# Patient Record
Sex: MALE | Race: WHITE | NOT HISPANIC OR LATINO | URBAN - NONMETROPOLITAN AREA
[De-identification: names, ages, dates, MRNs, and addresses within clinical notes are randomized per-mention and may not be internally consistent; named-entity substitution may affect disease eponyms.]

---

## 2017-01-17 ENCOUNTER — DOCTOR'S OFFICE (OUTPATIENT)
Dept: URBAN - NONMETROPOLITAN AREA CLINIC 1 | Facility: CLINIC | Age: 67
Setting detail: OPHTHALMOLOGY
End: 2017-01-17
Payer: COMMERCIAL

## 2017-01-17 DIAGNOSIS — H00.15: ICD-10-CM

## 2017-01-17 PROCEDURE — 92012 INTRM OPH EXAM EST PATIENT: CPT | Performed by: OPHTHALMOLOGY

## 2017-01-17 ASSESSMENT — REFRACTION_MANIFEST
OD_SPHERE: +1.00
OD_VA1: 20/25
OS_VA1: 20/25
OS_ADD: +2.50
OS_VA2: 20/
OS_CYLINDER: -1.00
OS_VA3: 20/
OS_VA1: 20/
OS_VA1: 20/
OU_VA: 20/
OD_VA3: 20/
OD_VA1: 20/
OS_AXIS: 085
OD_AXIS: 105
OS_VA3: 20/
OD_VA2: 20/
OS_VA3: 20/
OS_VA2: 20/25
OS_SPHERE: +0.50
OD_VA2: 20/
OD_CYLINDER: -1.00
OD_VA2: 20/25
OD_VA3: 20/
OD_VA3: 20/
OS_VA2: 20/
OU_VA: 20/
OU_VA: 20/
OD_ADD: +2.50
OD_VA1: 20/

## 2017-01-17 ASSESSMENT — REFRACTION_CURRENTRX
OD_OVR_VA: 20/
OS_OVR_VA: 20/
OD_OVR_VA: 20/
OS_OVR_VA: 20/
OD_OVR_VA: 20/
OS_OVR_VA: 20/

## 2017-01-17 ASSESSMENT — REFRACTION_AUTOREFRACTION
OS_AXIS: 64
OD_CYLINDER: -1.25
OD_AXIS: 104
OS_SPHERE: +0.25
OS_CYLINDER: -0.75
OD_SPHERE: +1.50

## 2017-01-17 ASSESSMENT — SPHEQUIV_DERIVED
OD_SPHEQUIV: 0.875
OS_SPHEQUIV: 0
OS_SPHEQUIV: -0.125
OD_SPHEQUIV: 0.5

## 2017-01-17 ASSESSMENT — LID EXAM ASSESSMENTS
OS_BLEPHARITIS: LLL LUL T
OD_BLEPHARITIS: RLL RUL T

## 2017-01-17 ASSESSMENT — VISUAL ACUITY
OS_BCVA: 20/40+1
OD_BCVA: 20/40-2

## 2018-08-09 ENCOUNTER — HOSPITAL ENCOUNTER (EMERGENCY)
Facility: HOSPITAL | Age: 68
Discharge: LEFT BEFORE DISCHARGE | End: 2018-08-10
Attending: EMERGENCY MEDICINE
Payer: MEDICARE

## 2018-08-09 ENCOUNTER — APPOINTMENT (OUTPATIENT)
Dept: RADIOLOGY | Facility: HOSPITAL | Age: 68
End: 2018-08-09
Payer: MEDICARE

## 2018-08-09 DIAGNOSIS — J06.9 URI (UPPER RESPIRATORY INFECTION): ICD-10-CM

## 2018-08-09 DIAGNOSIS — R51.9 CHRONIC HEADACHES: Primary | ICD-10-CM

## 2018-08-09 DIAGNOSIS — G89.29 CHRONIC HEADACHES: Primary | ICD-10-CM

## 2018-08-09 DIAGNOSIS — I10 HYPERTENSION: ICD-10-CM

## 2018-08-09 LAB — LACTATE BLDV-SCNC: 0.9 MMOL/L (ref 0.5–1.99)

## 2018-08-09 PROCEDURE — 6360000200 HC RX 636 W HCPCS (ALT 250 FOR IP): Performed by: EMERGENCY MEDICINE

## 2018-08-09 PROCEDURE — 85610 PROTHROMBIN TIME: CPT | Performed by: EMERGENCY MEDICINE

## 2018-08-09 PROCEDURE — 83605 ASSAY OF LACTIC ACID: CPT

## 2018-08-09 PROCEDURE — 36415 COLL VENOUS BLD VENIPUNCTURE: CPT | Performed by: EMERGENCY MEDICINE

## 2018-08-09 PROCEDURE — 85379 FIBRIN DEGRADATION QUANT: CPT | Performed by: EMERGENCY MEDICINE

## 2018-08-09 PROCEDURE — 93005 ELECTROCARDIOGRAM TRACING: CPT

## 2018-08-09 PROCEDURE — 83880 ASSAY OF NATRIURETIC PEPTIDE: CPT | Performed by: EMERGENCY MEDICINE

## 2018-08-09 PROCEDURE — 84484 ASSAY OF TROPONIN QUANT: CPT | Performed by: EMERGENCY MEDICINE

## 2018-08-09 PROCEDURE — 85730 THROMBOPLASTIN TIME PARTIAL: CPT | Performed by: EMERGENCY MEDICINE

## 2018-08-09 PROCEDURE — 84443 ASSAY THYROID STIM HORMONE: CPT | Performed by: EMERGENCY MEDICINE

## 2018-08-09 PROCEDURE — 82150 ASSAY OF AMYLASE: CPT | Performed by: EMERGENCY MEDICINE

## 2018-08-09 PROCEDURE — 71045 X-RAY EXAM CHEST 1 VIEW: CPT

## 2018-08-09 PROCEDURE — 99284 EMERGENCY DEPT VISIT MOD MDM: CPT | Performed by: EMERGENCY MEDICINE

## 2018-08-09 PROCEDURE — 84439 ASSAY OF FREE THYROXINE: CPT | Performed by: EMERGENCY MEDICINE

## 2018-08-09 PROCEDURE — 85025 COMPLETE CBC W/AUTO DIFF WBC: CPT | Performed by: EMERGENCY MEDICINE

## 2018-08-09 PROCEDURE — 80053 COMPREHEN METABOLIC PANEL: CPT | Performed by: EMERGENCY MEDICINE

## 2018-08-09 PROCEDURE — 2580000300 HC RX 258: Performed by: EMERGENCY MEDICINE

## 2018-08-09 PROCEDURE — 83690 ASSAY OF LIPASE: CPT | Performed by: EMERGENCY MEDICINE

## 2018-08-09 PROCEDURE — 83735 ASSAY OF MAGNESIUM: CPT | Performed by: EMERGENCY MEDICINE

## 2018-08-09 RX ORDER — EZETIMIBE 10 MG/1
10 TABLET ORAL DAILY
COMMUNITY

## 2018-08-09 RX ORDER — SODIUM CHLORIDE 9 MG/ML
1000 INJECTION, SOLUTION INTRAVENOUS ONCE
Status: COMPLETED | OUTPATIENT
Start: 2018-08-09 | End: 2018-08-10

## 2018-08-09 RX ORDER — ONDANSETRON HYDROCHLORIDE 2 MG/ML
4 INJECTION, SOLUTION INTRAVENOUS ONCE
Status: COMPLETED | OUTPATIENT
Start: 2018-08-09 | End: 2018-08-09

## 2018-08-09 RX ORDER — PANTOPRAZOLE SODIUM 40 MG/1
40 TABLET, DELAYED RELEASE ORAL DAILY
COMMUNITY

## 2018-08-09 RX ORDER — NIFEDIPINE 10 MG/1
10 CAPSULE ORAL AS NEEDED
COMMUNITY

## 2018-08-09 RX ORDER — LORAZEPAM 1 MG/1
1 TABLET ORAL 3 TIMES DAILY
COMMUNITY

## 2018-08-09 RX ORDER — CARVEDILOL 6.25 MG/1
6.25 TABLET ORAL 2 TIMES DAILY WITH MEALS
COMMUNITY

## 2018-08-09 RX ORDER — IPRATROPIUM BROMIDE AND ALBUTEROL SULFATE 2.5; .5 MG/3ML; MG/3ML
3 SOLUTION RESPIRATORY (INHALATION) ONCE
Status: COMPLETED | OUTPATIENT
Start: 2018-08-09 | End: 2018-08-09

## 2018-08-09 RX ORDER — SODIUM CHLORIDE 9 MG/ML
100 INJECTION, SOLUTION INTRAVENOUS CONTINUOUS
Status: DISCONTINUED | OUTPATIENT
Start: 2018-08-09 | End: 2018-08-10 | Stop reason: HOSPADM

## 2018-08-09 RX ADMIN — SODIUM CHLORIDE 1000 ML: 9 INJECTION, SOLUTION INTRAVENOUS at 23:59

## 2018-08-09 RX ADMIN — ONDANSETRON HYDROCHLORIDE 4 MG: 2 INJECTION, SOLUTION INTRAMUSCULAR; INTRAVENOUS at 23:59

## 2018-08-09 RX ADMIN — IPRATROPIUM BROMIDE AND ALBUTEROL SULFATE 3 ML: .5; 3 SOLUTION RESPIRATORY (INHALATION) at 23:55

## 2018-08-09 ASSESSMENT — PAIN DESCRIPTION - DESCRIPTORS: DESCRIPTORS: PRESSURE

## 2018-08-10 ENCOUNTER — APPOINTMENT (OUTPATIENT)
Dept: CT IMAGING | Facility: HOSPITAL | Age: 68
End: 2018-08-10
Payer: MEDICARE

## 2018-08-10 VITALS
OXYGEN SATURATION: 93 % | TEMPERATURE: 97.9 F | DIASTOLIC BLOOD PRESSURE: 60 MMHG | HEIGHT: 68 IN | RESPIRATION RATE: 18 BRPM | SYSTOLIC BLOOD PRESSURE: 120 MMHG | BODY MASS INDEX: 36.65 KG/M2 | HEART RATE: 68 BPM | WEIGHT: 241.84 LBS

## 2018-08-10 LAB
ALBUMIN SERPL-MCNC: 4.5 G/DL (ref 3.5–5.3)
ALP SERPL-CCNC: 70 U/L (ref 45–115)
ALT SERPL-CCNC: 30 U/L (ref 0–52)
AMYLASE SERPL-CCNC: 44 U/L (ref 27–103)
ANION GAP SERPL CALC-SCNC: 10 MMOL/L (ref 3–11)
APTT PPP: 28 SECONDS (ref 24–37)
AST SERPL-CCNC: 23 U/L (ref 0–39)
B PARAP IS1001 DNA NPH QL NAA+NON-PROBE: NEGATIVE
B PERT.PT PRMT NPH QL NAA+NON-PROBE: NEGATIVE
BACTERIA #/AREA URNS AUTO: NORMAL /HPF
BASOPHILS # BLD AUTO: 0 10*3/UL
BASOPHILS NFR BLD AUTO: 1 % (ref 0–2)
BILIRUB SERPL-MCNC: 0.74 MG/DL (ref 0–1.4)
BILIRUB UR QL STRIP.AUTO: NEGATIVE
BNP SERPL-MCNC: 20 PG/ML (ref 0–99)
BUN SERPL-MCNC: 24 MG/DL (ref 7–25)
C PNEUM DNA NPH QL NAA+NON-PROBE: NEGATIVE
CALCIUM ALBUM COR SERPL-MCNC: 9 MG/DL (ref 8.5–10.1)
CALCIUM SERPL-MCNC: 9.4 MG/DL (ref 8.6–10.3)
CHLORIDE SERPL-SCNC: 103 MMOL/L (ref 98–107)
CLARITY UR: CLEAR
CO2 SERPL-SCNC: 25 MMOL/L (ref 21–32)
COLOR UR: ABNORMAL
CREAT SERPL-MCNC: 1.6 MG/DL (ref 0.8–1.3)
D DIMER PPP FEU-MCNC: 0.77 UG/ML FEU (ref 0–0.5)
EOSINOPHIL # BLD AUTO: 0 10*3/UL
EOSINOPHIL NFR BLD AUTO: 1 % (ref 0–3)
ERYTHROCYTE [DISTWIDTH] IN BLOOD BY AUTOMATED COUNT: 14.1 % (ref 11.5–15)
FLUAV RNA NPH QL NAA+NON-PROBE: NEGATIVE
FLUBV RNA NPH QL NAA+NON-PROBE: NEGATIVE
GFR SERPL CREATININE-BSD FRML MDRD: 43 ML/MIN/1.73M*2
GLUCOSE SERPL-MCNC: 121 MG/DL (ref 70–105)
GLUCOSE UR STRIP.AUTO-MCNC: NEGATIVE MG/DL
HADV DNA NPH QL NAA+NON-PROBE: NEGATIVE
HCOV 229E RNA NPH QL NAA+NON-PROBE: NEGATIVE
HCOV HKU1 RNA NPH QL NAA+NON-PROBE: NEGATIVE
HCOV NL63 RNA NPH QL NAA+NON-PROBE: NEGATIVE
HCOV OC43 RNA NPH QL NAA+NON-PROBE: NEGATIVE
HCT VFR BLD AUTO: 43.7 % (ref 38–50)
HGB BLD-MCNC: 15.5 G/DL (ref 13.2–17.2)
HGB UR QL STRIP.AUTO: ABNORMAL
HMPV RNA NPH QL NAA+NON-PROBE: NEGATIVE
HPIV1 RNA NPH QL NAA+NON-PROBE: NEGATIVE
HPIV2 RNA NPH QL NAA+NON-PROBE: NEGATIVE
HPIV3 RNA NPH QL NAA+NON-PROBE: NEGATIVE
HPIV4 RNA NPH QL NAA+NON-PROBE: NEGATIVE
INR PPP: 1.13 (ref 0.9–1.1)
KETONES UR STRIP.AUTO-MCNC: NEGATIVE MG/DL
LEUKOCYTE ESTERASE UR QL STRIP: NEGATIVE
LIPASE SERPL-CCNC: 36 U/L (ref 11–82)
LYMPHOCYTES # BLD AUTO: 2.3 10*3/UL
LYMPHOCYTES NFR BLD AUTO: 32 % (ref 15–47)
M PNEUMO DNA NPH QL NAA+NON-PROBE: NEGATIVE
MAGNESIUM SERPL-MCNC: 2 MG/DL (ref 1.8–2.4)
MCH RBC QN AUTO: 32.5 PG (ref 29–34)
MCHC RBC AUTO-ENTMCNC: 35.5 G/DL (ref 32–36)
MCV RBC AUTO: 91.5 FL (ref 82–97)
MONOCYTES # BLD AUTO: 0.7 10*3/UL
MONOCYTES NFR BLD AUTO: 10 % (ref 5–13)
NEUTROPHILS # BLD AUTO: 4.3 10*3/UL
NEUTROPHILS NFR BLD AUTO: 58 % (ref 46–70)
NITRITE UR QL STRIP.AUTO: NEGATIVE
PH UR STRIP.AUTO: 5 PH
PLATELET # BLD AUTO: 195 10*3/UL (ref 130–350)
PMV BLD AUTO: 7.7 FL (ref 6.9–10.8)
POTASSIUM SERPL-SCNC: 3.8 MMOL/L (ref 3.5–5.1)
PROT SERPL-MCNC: 7.4 G/DL (ref 6–8.3)
PROT UR STRIP.AUTO-MCNC: NEGATIVE MG/DL
PROTHROMBIN TIME: 14.1 SECONDS (ref 11.9–14.2)
RBC # BLD AUTO: 4.77 10*6/ΜL (ref 4.1–5.8)
RBC #/AREA URNS AUTO: NORMAL /HPF
RSV RNA NPH QL NAA+NON-PROBE: NEGATIVE
RV+EV RNA NPH QL NAA+NON-PROBE: NEGATIVE
SODIUM SERPL-SCNC: 138 MMOL/L (ref 135–145)
SP GR UR STRIP.AUTO: 1.01 (ref 1–1.03)
SQUAMOUS #/AREA URNS AUTO: NEGATIVE /HPF
T4 FREE SERPL-MCNC: 0.91 NG/DL (ref 0.6–1.2)
TROPONIN I SERPL-MCNC: <0.03 NG/ML
TSH SERPL DL<=0.05 MIU/L-ACNC: 4.95 UIU/ML (ref 0.34–4.82)
UROBILINOGEN UR STRIP.AUTO-MCNC: <2 E.U./DL
WBC # BLD AUTO: 7.4 10*3/UL (ref 3.7–9.6)
WBC #/AREA URNS AUTO: NORMAL /HPF

## 2018-08-10 PROCEDURE — 96374 THER/PROPH/DIAG INJ IV PUSH: CPT

## 2018-08-10 PROCEDURE — 87798 DETECT AGENT NOS DNA AMP: CPT | Performed by: EMERGENCY MEDICINE

## 2018-08-10 PROCEDURE — 94640 AIRWAY INHALATION TREATMENT: CPT

## 2018-08-10 PROCEDURE — 99284 EMERGENCY DEPT VISIT MOD MDM: CPT

## 2018-08-10 PROCEDURE — 2550000100 HC RX 255: Performed by: EMERGENCY MEDICINE

## 2018-08-10 PROCEDURE — 81001 URINALYSIS AUTO W/SCOPE: CPT | Performed by: EMERGENCY MEDICINE

## 2018-08-10 PROCEDURE — 70498 CT ANGIOGRAPHY NECK: CPT

## 2018-08-10 RX ORDER — AMOXICILLIN AND CLAVULANATE POTASSIUM 875; 125 MG/1; MG/1
1 TABLET, FILM COATED ORAL 2 TIMES DAILY
Qty: 14 TABLET | Refills: 0 | Status: SHIPPED | OUTPATIENT
Start: 2018-08-10 | End: 2018-08-17

## 2018-08-10 RX ORDER — PROPARACAINE HYDROCHLORIDE 5 MG/ML
SOLUTION/ DROPS OPHTHALMIC
Status: DISCONTINUED
Start: 2018-08-10 | End: 2018-08-10 | Stop reason: HOSPADM

## 2018-08-10 RX ORDER — IOPAMIDOL 755 MG/ML
100 INJECTION, SOLUTION INTRAVASCULAR ONCE
Status: COMPLETED | OUTPATIENT
Start: 2018-08-10 | End: 2018-08-10

## 2018-08-10 RX ADMIN — IOPAMIDOL 100 ML: 755 INJECTION, SOLUTION INTRAVENOUS at 00:50
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
## 2018-08-20 NOTE — INTERDISCIPLINARY/THERAPY
ED CM: ENDER. Pt at home in [place] and has not f/u with his PCP yet. He states that he will continue to call.

## 2019-08-05 ENCOUNTER — DOCTOR'S OFFICE (OUTPATIENT)
Dept: URBAN - NONMETROPOLITAN AREA CLINIC 1 | Facility: CLINIC | Age: 69
Setting detail: OPHTHALMOLOGY
End: 2019-08-05
Payer: COMMERCIAL

## 2019-08-05 ENCOUNTER — RX ONLY (RX ONLY)
Age: 69
End: 2019-08-05

## 2019-08-05 DIAGNOSIS — H53.123: ICD-10-CM

## 2019-08-05 PROCEDURE — NO CHARGE N/C PROFESSIONAL COURTESY: Performed by: OPHTHALMOLOGY

## 2019-08-05 PROCEDURE — 92012 INTRM OPH EXAM EST PATIENT: CPT | Performed by: OPHTHALMOLOGY

## 2019-08-05 ASSESSMENT — REFRACTION_AUTOREFRACTION
OD_CYLINDER: -1.25
OD_SPHERE: +1.50
OD_AXIS: 104
OS_CYLINDER: -0.75
OS_AXIS: 64
OS_SPHERE: +0.25

## 2019-08-05 ASSESSMENT — LID EXAM ASSESSMENTS
OS_BLEPHARITIS: LLL LUL T
OD_BLEPHARITIS: RLL RUL T

## 2019-08-05 ASSESSMENT — REFRACTION_MANIFEST
OD_VA1: 20/
OD_SPHERE: +1.00
OS_VA1: 20/
OU_VA: 20/
OS_SPHERE: +0.50
OS_VA3: 20/
OD_VA2: 20/
OS_VA2: 20/
OU_VA: 20/
OD_VA3: 20/
OS_ADD: +2.50
OD_AXIS: 105
OD_ADD: +2.50
OD_VA3: 20/
OS_VA1: 20/25
OS_VA3: 20/
OD_VA2: 20/25
OS_CYLINDER: -1.00
OS_AXIS: 085
OD_VA1: 20/25
OS_VA2: 20/25
OD_CYLINDER: -1.00

## 2019-08-05 ASSESSMENT — REFRACTION_CURRENTRX
OS_OVR_VA: 20/
OD_OVR_VA: 20/
OS_OVR_VA: 20/
OS_OVR_VA: 20/
OD_OVR_VA: 20/
OD_OVR_VA: 20/

## 2019-08-05 ASSESSMENT — SPHEQUIV_DERIVED
OD_SPHEQUIV: 0.875
OS_SPHEQUIV: -0.125
OD_SPHEQUIV: 0.5
OS_SPHEQUIV: 0

## 2019-08-05 ASSESSMENT — VISUAL ACUITY
OS_BCVA: 20/40+1
OD_BCVA: 20/40-1

## 2019-08-12 ENCOUNTER — DOCTOR'S OFFICE (OUTPATIENT)
Dept: URBAN - NONMETROPOLITAN AREA CLINIC 1 | Facility: CLINIC | Age: 69
Setting detail: OPHTHALMOLOGY
End: 2019-08-12
Payer: COMMERCIAL

## 2019-08-12 DIAGNOSIS — H53.121: ICD-10-CM

## 2019-08-12 DIAGNOSIS — H34.8322: ICD-10-CM

## 2019-08-12 DIAGNOSIS — H53.122: ICD-10-CM

## 2019-08-12 PROBLEM — H00.15 CHALAZION; LEFT LOWER LID: Status: RESOLVED | Noted: 2017-01-17 | Resolved: 2019-08-12

## 2019-08-12 PROCEDURE — 92235 FLUORESCEIN ANGRPH MLTIFRAME: CPT | Performed by: OPHTHALMOLOGY

## 2019-08-12 PROCEDURE — 92014 COMPRE OPH EXAM EST PT 1/>: CPT | Performed by: OPHTHALMOLOGY

## 2019-08-12 PROCEDURE — 92250 FUNDUS PHOTOGRAPHY W/I&R: CPT | Performed by: OPHTHALMOLOGY

## 2019-08-12 ASSESSMENT — REFRACTION_AUTOREFRACTION
OD_AXIS: 104
OS_CYLINDER: -0.75
OS_SPHERE: +0.25
OS_AXIS: 64
OD_SPHERE: +1.50
OD_CYLINDER: -1.25

## 2019-08-12 ASSESSMENT — SPHEQUIV_DERIVED
OD_SPHEQUIV: 0.875
OS_SPHEQUIV: -0.125
OD_SPHEQUIV: 0.5
OS_SPHEQUIV: 0

## 2019-08-12 ASSESSMENT — REFRACTION_MANIFEST
OS_VA3: 20/
OS_VA2: 20/25
OD_VA2: 20/25
OS_CYLINDER: -1.00
OD_VA2: 20/
OD_VA3: 20/
OD_CYLINDER: -1.00
OS_VA3: 20/
OS_VA1: 20/25
OS_VA1: 20/
OS_AXIS: 085
OS_VA2: 20/
OD_VA3: 20/
OD_VA1: 20/
OD_SPHERE: +1.00
OS_ADD: +2.50
OD_AXIS: 105
OD_VA1: 20/25
OU_VA: 20/
OS_SPHERE: +0.50
OD_ADD: +2.50
OU_VA: 20/

## 2019-08-12 ASSESSMENT — LID EXAM ASSESSMENTS
OS_BLEPHARITIS: LLL LUL T
OD_BLEPHARITIS: RLL RUL T

## 2019-08-12 ASSESSMENT — CONFRONTATIONAL VISUAL FIELD TEST (CVF)
OD_FINDINGS: FULL
OS_FINDINGS: FULL

## 2019-08-12 ASSESSMENT — VISUAL ACUITY
OS_BCVA: 20/25-2
OD_BCVA: 20/40-1

## 2019-08-12 ASSESSMENT — REFRACTION_CURRENTRX
OD_OVR_VA: 20/
OS_OVR_VA: 20/
OD_OVR_VA: 20/
OD_OVR_VA: 20/
OS_OVR_VA: 20/
OS_OVR_VA: 20/

## 2019-08-19 ENCOUNTER — DOCTOR'S OFFICE (OUTPATIENT)
Dept: URBAN - NONMETROPOLITAN AREA CLINIC 1 | Facility: CLINIC | Age: 69
Setting detail: OPHTHALMOLOGY
End: 2019-08-19
Payer: COMMERCIAL

## 2019-08-19 DIAGNOSIS — H34.8322: ICD-10-CM

## 2019-08-19 DIAGNOSIS — H53.123: ICD-10-CM

## 2019-08-19 PROCEDURE — 92014 COMPRE OPH EXAM EST PT 1/>: CPT | Performed by: OPHTHALMOLOGY

## 2019-08-19 ASSESSMENT — REFRACTION_MANIFEST
OU_VA: 20/
OD_VA3: 20/
OS_VA2: 20/25
OS_VA3: 20/
OD_AXIS: 105
OS_VA1: 20/25
OD_VA1: 20/
OD_SPHERE: +1.00
OS_AXIS: 085
OD_ADD: +2.50
OS_CYLINDER: -1.00
OD_VA1: 20/25
OS_VA1: 20/
OS_VA2: 20/
OD_CYLINDER: -1.00
OS_VA3: 20/
OS_SPHERE: +0.50
OD_VA2: 20/25
OU_VA: 20/
OS_ADD: +2.50
OD_VA3: 20/
OD_VA2: 20/

## 2019-08-19 ASSESSMENT — REFRACTION_AUTOREFRACTION
OD_AXIS: 104
OD_CYLINDER: -1.25
OS_SPHERE: +0.25
OS_CYLINDER: -0.75
OD_SPHERE: +1.50
OS_AXIS: 64

## 2019-08-19 ASSESSMENT — LID EXAM ASSESSMENTS
OS_BLEPHARITIS: LLL LUL T
OD_BLEPHARITIS: RLL RUL T

## 2019-08-19 ASSESSMENT — SPHEQUIV_DERIVED
OD_SPHEQUIV: 0.875
OS_SPHEQUIV: 0
OD_SPHEQUIV: 0.5
OS_SPHEQUIV: -0.125

## 2019-08-19 ASSESSMENT — REFRACTION_CURRENTRX
OS_OVR_VA: 20/
OS_OVR_VA: 20/
OD_OVR_VA: 20/
OD_OVR_VA: 20/
OS_OVR_VA: 20/
OD_OVR_VA: 20/

## 2019-08-19 ASSESSMENT — VISUAL ACUITY
OS_BCVA: 20/25
OD_BCVA: 20/30

## 2019-08-19 ASSESSMENT — CONFRONTATIONAL VISUAL FIELD TEST (CVF)
OS_FINDINGS: FULL
OD_FINDINGS: FULL

## 2019-09-19 ENCOUNTER — DOCTOR'S OFFICE (OUTPATIENT)
Dept: URBAN - NONMETROPOLITAN AREA CLINIC 1 | Facility: CLINIC | Age: 69
Setting detail: OPHTHALMOLOGY
End: 2019-09-19
Payer: COMMERCIAL

## 2019-09-19 DIAGNOSIS — H04.121: ICD-10-CM

## 2019-09-19 DIAGNOSIS — H53.123: ICD-10-CM

## 2019-09-19 DIAGNOSIS — H04.122: ICD-10-CM

## 2019-09-19 DIAGNOSIS — H34.8322: ICD-10-CM

## 2019-09-19 PROCEDURE — 92014 COMPRE OPH EXAM EST PT 1/>: CPT | Performed by: OPHTHALMOLOGY

## 2019-09-19 PROCEDURE — 83861 MICROFLUID ANALY TEARS: CPT | Performed by: OPHTHALMOLOGY

## 2019-09-19 PROCEDURE — 92134 CPTRZ OPH DX IMG PST SGM RTA: CPT | Performed by: OPHTHALMOLOGY

## 2019-09-19 ASSESSMENT — REFRACTION_MANIFEST
OS_CYLINDER: -1.00
OD_VA1: 20/
OS_VA1: 20/25
OS_ADD: +2.50
OD_VA2: 20/
OD_CYLINDER: -1.00
OS_VA3: 20/
OS_AXIS: 085
OD_VA3: 20/
OS_VA2: 20/25
OD_VA3: 20/
OD_VA1: 20/25
OS_VA2: 20/
OS_SPHERE: +0.50
OS_VA3: 20/
OD_SPHERE: +1.00
OD_ADD: +2.50
OS_VA1: 20/
OU_VA: 20/
OU_VA: 20/
OD_VA2: 20/25
OD_AXIS: 105

## 2019-09-19 ASSESSMENT — REFRACTION_CURRENTRX
OS_OVR_VA: 20/
OS_OVR_VA: 20/
OD_OVR_VA: 20/
OS_OVR_VA: 20/

## 2019-09-19 ASSESSMENT — REFRACTION_AUTOREFRACTION
OD_SPHERE: +1.50
OD_CYLINDER: -1.25
OD_AXIS: 104
OS_AXIS: 64
OS_SPHERE: +0.25
OS_CYLINDER: -0.75

## 2019-09-19 ASSESSMENT — CONFRONTATIONAL VISUAL FIELD TEST (CVF)
OS_FINDINGS: FULL
OD_FINDINGS: FULL

## 2019-09-19 ASSESSMENT — SPHEQUIV_DERIVED
OS_SPHEQUIV: -0.125
OS_SPHEQUIV: 0
OD_SPHEQUIV: 0.875
OD_SPHEQUIV: 0.5

## 2019-09-19 ASSESSMENT — LID EXAM ASSESSMENTS
OS_BLEPHARITIS: LLL LUL T
OD_BLEPHARITIS: RLL RUL T

## 2019-09-19 ASSESSMENT — VISUAL ACUITY
OD_BCVA: 20/40+1
OS_BCVA: 20/30+2

## 2019-10-24 ENCOUNTER — DOCTOR'S OFFICE (OUTPATIENT)
Dept: URBAN - NONMETROPOLITAN AREA CLINIC 1 | Facility: CLINIC | Age: 69
Setting detail: OPHTHALMOLOGY
End: 2019-10-24
Payer: COMMERCIAL

## 2019-10-24 ENCOUNTER — RX ONLY (RX ONLY)
Age: 69
End: 2019-10-24

## 2019-10-24 DIAGNOSIS — H04.123: ICD-10-CM

## 2019-10-24 DIAGNOSIS — H53.123: ICD-10-CM

## 2019-10-24 DIAGNOSIS — H43.812: ICD-10-CM

## 2019-10-24 DIAGNOSIS — H43.811: ICD-10-CM

## 2019-10-24 DIAGNOSIS — H04.121: ICD-10-CM

## 2019-10-24 DIAGNOSIS — H43.813: ICD-10-CM

## 2019-10-24 DIAGNOSIS — H04.122: ICD-10-CM

## 2019-10-24 DIAGNOSIS — H34.8322: ICD-10-CM

## 2019-10-24 PROCEDURE — 83861 MICROFLUID ANALY TEARS: CPT | Performed by: OPHTHALMOLOGY

## 2019-10-24 PROCEDURE — 92134 CPTRZ OPH DX IMG PST SGM RTA: CPT | Performed by: OPHTHALMOLOGY

## 2019-10-24 PROCEDURE — 92225 OPHTHALMOSCOPY EXTENDED INITIAL: CPT | Performed by: OPHTHALMOLOGY

## 2019-10-24 PROCEDURE — 92014 COMPRE OPH EXAM EST PT 1/>: CPT | Performed by: OPHTHALMOLOGY

## 2019-10-24 ASSESSMENT — REFRACTION_MANIFEST
OS_VA3: 20/
OD_VA1: 20/25
OD_CYLINDER: -1.00
OS_AXIS: 085
OS_CYLINDER: -1.00
OS_VA1: 20/25
OD_SPHERE: +1.00
OS_VA1: 20/
OS_VA2: 20/
OD_VA3: 20/
OS_ADD: +2.50
OD_ADD: +2.50
OS_VA2: 20/25
OD_VA3: 20/
OD_AXIS: 105
OS_SPHERE: +0.50
OD_VA2: 20/25
OS_VA3: 20/
OU_VA: 20/
OU_VA: 20/
OD_VA1: 20/
OD_VA2: 20/

## 2019-10-24 ASSESSMENT — REFRACTION_AUTOREFRACTION
OS_CYLINDER: -0.75
OD_AXIS: 104
OD_CYLINDER: -1.25
OD_SPHERE: +1.50
OS_SPHERE: +0.25
OS_AXIS: 64

## 2019-10-24 ASSESSMENT — REFRACTION_CURRENTRX
OD_OVR_VA: 20/
OS_OVR_VA: 20/
OD_OVR_VA: 20/
OD_OVR_VA: 20/

## 2019-10-24 ASSESSMENT — VISUAL ACUITY
OS_BCVA: 20/30-2
OD_BCVA: 20/50+2

## 2019-10-24 ASSESSMENT — LID EXAM ASSESSMENTS
OD_BLEPHARITIS: RLL RUL T
OS_BLEPHARITIS: LLL LUL T

## 2019-10-24 ASSESSMENT — CONFRONTATIONAL VISUAL FIELD TEST (CVF)
OD_FINDINGS: FULL
OS_FINDINGS: FULL

## 2019-10-24 ASSESSMENT — SPHEQUIV_DERIVED
OD_SPHEQUIV: 0.875
OS_SPHEQUIV: 0
OS_SPHEQUIV: -0.125
OD_SPHEQUIV: 0.5

## 2019-11-25 ENCOUNTER — DOCTOR'S OFFICE (OUTPATIENT)
Dept: URBAN - NONMETROPOLITAN AREA CLINIC 1 | Facility: CLINIC | Age: 69
Setting detail: OPHTHALMOLOGY
End: 2019-11-25
Payer: COMMERCIAL

## 2019-11-25 DIAGNOSIS — H53.123: ICD-10-CM

## 2019-11-25 DIAGNOSIS — H04.122: ICD-10-CM

## 2019-11-25 DIAGNOSIS — H04.123: ICD-10-CM

## 2019-11-25 DIAGNOSIS — H04.121: ICD-10-CM

## 2019-11-25 DIAGNOSIS — H34.8322: ICD-10-CM

## 2019-11-25 PROCEDURE — 92014 COMPRE OPH EXAM EST PT 1/>: CPT | Performed by: OPHTHALMOLOGY

## 2019-11-25 PROCEDURE — 92134 CPTRZ OPH DX IMG PST SGM RTA: CPT | Performed by: OPHTHALMOLOGY

## 2019-11-25 PROCEDURE — 83861 MICROFLUID ANALY TEARS: CPT | Performed by: OPHTHALMOLOGY

## 2019-11-25 ASSESSMENT — REFRACTION_MANIFEST
OD_VA1: 20/25
OS_VA2: 20/
OD_VA3: 20/
OS_SPHERE: +0.50
OS_VA2: 20/25
OS_VA3: 20/
OS_VA3: 20/
OS_AXIS: 085
OD_VA2: 20/
OS_VA1: 20/
OU_VA: 20/
OD_CYLINDER: -1.00
OD_AXIS: 105
OS_CYLINDER: -1.00
OD_SPHERE: +1.00
OD_VA3: 20/
OD_VA2: 20/25
OS_ADD: +2.50
OD_ADD: +2.50
OU_VA: 20/
OD_VA1: 20/
OS_VA1: 20/25

## 2019-11-25 ASSESSMENT — CONFRONTATIONAL VISUAL FIELD TEST (CVF)
OD_FINDINGS: FULL
OS_FINDINGS: FULL

## 2019-11-25 ASSESSMENT — LID EXAM ASSESSMENTS
OD_BLEPHARITIS: RLL RUL T
OS_BLEPHARITIS: LLL LUL T

## 2019-11-25 ASSESSMENT — VISUAL ACUITY
OD_BCVA: 20/40-1
OS_BCVA: 20/30-2

## 2019-11-25 ASSESSMENT — REFRACTION_CURRENTRX
OD_OVR_VA: 20/
OS_OVR_VA: 20/
OD_OVR_VA: 20/
OS_OVR_VA: 20/
OD_OVR_VA: 20/
OS_OVR_VA: 20/

## 2019-11-25 ASSESSMENT — REFRACTION_AUTOREFRACTION
OS_CYLINDER: -0.75
OD_SPHERE: +1.50
OS_AXIS: 64
OD_CYLINDER: -1.25
OD_AXIS: 104
OS_SPHERE: +0.25

## 2019-11-25 ASSESSMENT — SPHEQUIV_DERIVED
OS_SPHEQUIV: 0
OS_SPHEQUIV: -0.125
OD_SPHEQUIV: 0.5
OD_SPHEQUIV: 0.875

## 2020-01-23 ENCOUNTER — DOCTOR'S OFFICE (OUTPATIENT)
Dept: URBAN - NONMETROPOLITAN AREA CLINIC 1 | Facility: CLINIC | Age: 70
Setting detail: OPHTHALMOLOGY
End: 2020-01-23
Payer: COMMERCIAL

## 2020-01-23 DIAGNOSIS — H04.122: ICD-10-CM

## 2020-01-23 DIAGNOSIS — H04.121: ICD-10-CM

## 2020-01-23 DIAGNOSIS — H34.8322: ICD-10-CM

## 2020-01-23 DIAGNOSIS — H04.123: ICD-10-CM

## 2020-01-23 DIAGNOSIS — H25.13: ICD-10-CM

## 2020-01-23 DIAGNOSIS — H53.123: ICD-10-CM

## 2020-01-23 PROCEDURE — 92202 OPSCPY EXTND ON/MAC DRAW: CPT | Performed by: OPHTHALMOLOGY

## 2020-01-23 PROCEDURE — 92134 CPTRZ OPH DX IMG PST SGM RTA: CPT | Performed by: OPHTHALMOLOGY

## 2020-01-23 PROCEDURE — 83861 MICROFLUID ANALY TEARS: CPT | Performed by: OPHTHALMOLOGY

## 2020-01-23 PROCEDURE — 92014 COMPRE OPH EXAM EST PT 1/>: CPT | Performed by: OPHTHALMOLOGY

## 2020-01-23 ASSESSMENT — REFRACTION_MANIFEST
OS_VA2: 20/25
OD_VA2: 20/25
OU_VA: 20/
OS_VA3: 20/
OD_AXIS: 105
OS_AXIS: 085
OD_CYLINDER: -1.00
OD_VA3: 20/
OD_VA1: 20/25
OS_VA1: 20/25
OS_CYLINDER: -1.00
OD_SPHERE: +1.00
OS_ADD: +2.50
OD_ADD: +2.50
OS_SPHERE: +0.50

## 2020-01-23 ASSESSMENT — REFRACTION_AUTOREFRACTION
OD_CYLINDER: -1.25
OD_AXIS: 104
OD_SPHERE: +1.50
OS_AXIS: 64
OS_CYLINDER: -0.75
OS_SPHERE: +0.25

## 2020-01-23 ASSESSMENT — SPHEQUIV_DERIVED
OD_SPHEQUIV: 0.875
OS_SPHEQUIV: 0
OS_SPHEQUIV: -0.125
OD_SPHEQUIV: 0.5

## 2020-01-23 ASSESSMENT — LID EXAM ASSESSMENTS
OD_BLEPHARITIS: RLL RUL T
OS_BLEPHARITIS: LLL LUL T

## 2020-01-23 ASSESSMENT — VISUAL ACUITY
OD_BCVA: 20/40-1
OS_BCVA: 20/30-2

## 2020-01-23 ASSESSMENT — CONFRONTATIONAL VISUAL FIELD TEST (CVF)
OD_FINDINGS: FULL
OS_FINDINGS: FULL

## 2020-01-23 ASSESSMENT — REFRACTION_CURRENTRX
OS_OVR_VA: 20/
OD_OVR_VA: 20/

## 2020-01-30 ENCOUNTER — DOCTOR'S OFFICE (OUTPATIENT)
Dept: URBAN - NONMETROPOLITAN AREA CLINIC 1 | Facility: CLINIC | Age: 70
Setting detail: OPHTHALMOLOGY
End: 2020-01-30
Payer: COMMERCIAL

## 2020-01-30 DIAGNOSIS — H25.13: ICD-10-CM

## 2020-01-30 DIAGNOSIS — H04.123: ICD-10-CM

## 2020-01-30 DIAGNOSIS — H53.123: ICD-10-CM

## 2020-01-30 DIAGNOSIS — H34.8322: ICD-10-CM

## 2020-01-30 PROCEDURE — 92250 FUNDUS PHOTOGRAPHY W/I&R: CPT | Performed by: OPHTHALMOLOGY

## 2020-01-30 PROCEDURE — 92014 COMPRE OPH EXAM EST PT 1/>: CPT | Performed by: OPHTHALMOLOGY

## 2020-01-30 PROCEDURE — 92235 FLUORESCEIN ANGRPH MLTIFRAME: CPT | Performed by: OPHTHALMOLOGY

## 2020-01-30 ASSESSMENT — REFRACTION_AUTOREFRACTION
OD_CYLINDER: -1.25
OD_AXIS: 104
OS_CYLINDER: -0.75
OS_SPHERE: +0.25
OS_AXIS: 64
OD_SPHERE: +1.50

## 2020-01-30 ASSESSMENT — REFRACTION_MANIFEST
OS_SPHERE: +0.50
OD_VA3: 20/
OU_VA: 20/
OD_SPHERE: +1.00
OS_AXIS: 085
OD_VA2: 20/25
OS_VA3: 20/
OS_CYLINDER: -1.00
OD_VA1: 20/25
OS_VA1: 20/25
OD_AXIS: 105
OD_CYLINDER: -1.00
OS_ADD: +2.50
OD_ADD: +2.50
OS_VA2: 20/25

## 2020-01-30 ASSESSMENT — VISUAL ACUITY
OS_BCVA: 20/30-2
OD_BCVA: 20/40

## 2020-01-30 ASSESSMENT — REFRACTION_CURRENTRX
OS_OVR_VA: 20/
OD_OVR_VA: 20/

## 2020-01-30 ASSESSMENT — SPHEQUIV_DERIVED
OD_SPHEQUIV: 0.875
OD_SPHEQUIV: 0.5
OS_SPHEQUIV: -0.125
OS_SPHEQUIV: 0

## 2020-01-30 ASSESSMENT — LID EXAM ASSESSMENTS
OD_BLEPHARITIS: RLL RUL T
OS_BLEPHARITIS: LLL LUL T

## 2020-01-30 ASSESSMENT — CONFRONTATIONAL VISUAL FIELD TEST (CVF)
OS_FINDINGS: FULL
OD_FINDINGS: FULL

## 2020-03-12 ENCOUNTER — DOCTOR'S OFFICE (OUTPATIENT)
Dept: URBAN - NONMETROPOLITAN AREA CLINIC 1 | Facility: CLINIC | Age: 70
Setting detail: OPHTHALMOLOGY
End: 2020-03-12
Payer: COMMERCIAL

## 2020-03-12 DIAGNOSIS — H04.121: ICD-10-CM

## 2020-03-12 DIAGNOSIS — H53.123: ICD-10-CM

## 2020-03-12 DIAGNOSIS — H34.8322: ICD-10-CM

## 2020-03-12 DIAGNOSIS — H25.13: ICD-10-CM

## 2020-03-12 DIAGNOSIS — H04.122: ICD-10-CM

## 2020-03-12 PROCEDURE — 83861 MICROFLUID ANALY TEARS: CPT | Performed by: OPHTHALMOLOGY

## 2020-03-12 PROCEDURE — 92202 OPSCPY EXTND ON/MAC DRAW: CPT | Performed by: OPHTHALMOLOGY

## 2020-03-12 PROCEDURE — 92014 COMPRE OPH EXAM EST PT 1/>: CPT | Performed by: OPHTHALMOLOGY

## 2020-03-12 PROCEDURE — 92134 CPTRZ OPH DX IMG PST SGM RTA: CPT | Performed by: OPHTHALMOLOGY

## 2020-03-12 ASSESSMENT — REFRACTION_MANIFEST
OS_VA1: 20/25
OD_VA1: 20/25
OD_SPHERE: +1.00
OS_CYLINDER: -1.00
OS_ADD: +2.50
OD_CYLINDER: -1.00
OD_VA2: 20/25
OS_VA2: 20/25
OD_ADD: +2.50
OS_SPHERE: +0.50
OD_AXIS: 105
OS_AXIS: 085

## 2020-03-12 ASSESSMENT — SPHEQUIV_DERIVED
OS_SPHEQUIV: 0
OS_SPHEQUIV: -0.125
OD_SPHEQUIV: 0.5
OD_SPHEQUIV: 0.875

## 2020-03-12 ASSESSMENT — REFRACTION_AUTOREFRACTION
OD_AXIS: 104
OS_CYLINDER: -0.75
OD_SPHERE: +1.50
OS_AXIS: 64
OS_SPHERE: +0.25
OD_CYLINDER: -1.25

## 2020-03-12 ASSESSMENT — CONFRONTATIONAL VISUAL FIELD TEST (CVF)
OS_FINDINGS: FULL
OD_FINDINGS: FULL

## 2020-03-12 ASSESSMENT — VISUAL ACUITY
OS_BCVA: 20/50-2
OD_BCVA: 20/30-2

## 2020-03-12 ASSESSMENT — LID EXAM ASSESSMENTS
OD_BLEPHARITIS: RLL RUL T
OS_BLEPHARITIS: LLL LUL T

## 2020-03-12 ASSESSMENT — REFRACTION_CURRENTRX
OD_OVR_VA: 20/
OS_OVR_VA: 20/

## 2020-05-29 ENCOUNTER — DOCTOR'S OFFICE (OUTPATIENT)
Dept: URBAN - NONMETROPOLITAN AREA CLINIC 1 | Facility: CLINIC | Age: 70
Setting detail: OPHTHALMOLOGY
End: 2020-05-29
Payer: COMMERCIAL

## 2020-05-29 ENCOUNTER — RX ONLY (RX ONLY)
Age: 70
End: 2020-05-29

## 2020-05-29 DIAGNOSIS — H25.12: ICD-10-CM

## 2020-05-29 DIAGNOSIS — H25.11: ICD-10-CM

## 2020-05-29 DIAGNOSIS — H53.121: ICD-10-CM

## 2020-05-29 DIAGNOSIS — H34.8322: ICD-10-CM

## 2020-05-29 DIAGNOSIS — H04.123: ICD-10-CM

## 2020-05-29 DIAGNOSIS — H53.122: ICD-10-CM

## 2020-05-29 PROCEDURE — 92134 CPTRZ OPH DX IMG PST SGM RTA: CPT | Performed by: OPHTHALMOLOGY

## 2020-05-29 PROCEDURE — 92014 COMPRE OPH EXAM EST PT 1/>: CPT | Performed by: OPHTHALMOLOGY

## 2020-05-29 PROCEDURE — 83861 MICROFLUID ANALY TEARS: CPT | Performed by: OPHTHALMOLOGY

## 2020-05-29 PROCEDURE — 92202 OPSCPY EXTND ON/MAC DRAW: CPT | Performed by: OPHTHALMOLOGY

## 2020-05-29 ASSESSMENT — CONFRONTATIONAL VISUAL FIELD TEST (CVF)
OS_FINDINGS: FULL
OD_FINDINGS: FULL

## 2020-05-29 ASSESSMENT — REFRACTION_AUTOREFRACTION
OD_AXIS: 104
OD_CYLINDER: -1.25
OS_CYLINDER: -0.75
OS_AXIS: 64
OD_SPHERE: +1.50
OS_SPHERE: +0.25

## 2020-05-29 ASSESSMENT — REFRACTION_MANIFEST
OS_ADD: +2.50
OS_VA2: 20/25
OD_AXIS: 105
OD_VA1: 20/25
OS_VA1: 20/25
OS_AXIS: 085
OD_VA2: 20/25
OD_SPHERE: +1.00
OS_CYLINDER: -1.00
OS_SPHERE: +0.50
OD_ADD: +2.50
OD_CYLINDER: -1.00

## 2020-05-29 ASSESSMENT — SPHEQUIV_DERIVED
OS_SPHEQUIV: 0
OD_SPHEQUIV: 0.875
OS_SPHEQUIV: -0.125
OD_SPHEQUIV: 0.5

## 2020-05-29 ASSESSMENT — VISUAL ACUITY
OD_BCVA: 20/30-2
OS_BCVA: 20/25-1

## 2020-05-29 ASSESSMENT — REFRACTION_CURRENTRX
OS_OVR_VA: 20/
OD_OVR_VA: 20/

## 2020-05-29 ASSESSMENT — LID EXAM ASSESSMENTS
OS_BLEPHARITIS: LLL LUL T
OD_BLEPHARITIS: RLL RUL T

## 2020-06-08 ENCOUNTER — DOCTOR'S OFFICE (OUTPATIENT)
Dept: URBAN - NONMETROPOLITAN AREA CLINIC 1 | Facility: CLINIC | Age: 70
Setting detail: OPHTHALMOLOGY
End: 2020-06-08
Payer: COMMERCIAL

## 2020-06-08 DIAGNOSIS — H35.3132: ICD-10-CM

## 2020-06-08 DIAGNOSIS — H34.8322: ICD-10-CM

## 2020-06-08 PROCEDURE — 92235 FLUORESCEIN ANGRPH MLTIFRAME: CPT | Performed by: OPHTHALMOLOGY

## 2020-06-08 PROCEDURE — 92250 FUNDUS PHOTOGRAPHY W/I&R: CPT | Performed by: OPHTHALMOLOGY

## 2020-06-08 PROCEDURE — 92014 COMPRE OPH EXAM EST PT 1/>: CPT | Performed by: OPHTHALMOLOGY

## 2020-06-08 ASSESSMENT — REFRACTION_AUTOREFRACTION
OS_SPHERE: +0.25
OD_SPHERE: +1.50
OD_CYLINDER: -1.25
OS_AXIS: 64
OD_AXIS: 104
OS_CYLINDER: -0.75

## 2020-06-08 ASSESSMENT — CONFRONTATIONAL VISUAL FIELD TEST (CVF)
OD_FINDINGS: FULL
OS_FINDINGS: FULL

## 2020-06-08 ASSESSMENT — REFRACTION_MANIFEST
OD_SPHERE: +1.00
OD_ADD: +2.50
OS_AXIS: 085
OS_ADD: +2.50
OS_VA1: 20/25
OD_AXIS: 105
OS_SPHERE: +0.50
OS_CYLINDER: -1.00
OD_CYLINDER: -1.00
OD_VA1: 20/25
OD_VA2: 20/25
OS_VA2: 20/25

## 2020-06-08 ASSESSMENT — REFRACTION_CURRENTRX
OD_OVR_VA: 20/
OS_OVR_VA: 20/

## 2020-06-08 ASSESSMENT — SPHEQUIV_DERIVED
OS_SPHEQUIV: 0
OD_SPHEQUIV: 0.5
OD_SPHEQUIV: 0.875
OS_SPHEQUIV: -0.125

## 2020-06-08 ASSESSMENT — VISUAL ACUITY
OD_BCVA: 20/30-2
OS_BCVA: 20/25-1

## 2020-06-08 ASSESSMENT — LID EXAM ASSESSMENTS
OD_BLEPHARITIS: RLL RUL T
OS_BLEPHARITIS: LLL LUL T

## 2020-08-03 ENCOUNTER — DOCTOR'S OFFICE (OUTPATIENT)
Dept: URBAN - NONMETROPOLITAN AREA CLINIC 1 | Facility: CLINIC | Age: 70
Setting detail: OPHTHALMOLOGY
End: 2020-08-03
Payer: COMMERCIAL

## 2020-08-03 DIAGNOSIS — H25.11: ICD-10-CM

## 2020-08-03 DIAGNOSIS — H34.8322: ICD-10-CM

## 2020-08-03 DIAGNOSIS — H53.122: ICD-10-CM

## 2020-08-03 DIAGNOSIS — H53.121: ICD-10-CM

## 2020-08-03 DIAGNOSIS — H35.3132: ICD-10-CM

## 2020-08-03 DIAGNOSIS — H25.12: ICD-10-CM

## 2020-08-03 PROCEDURE — 92134 CPTRZ OPH DX IMG PST SGM RTA: CPT | Performed by: OPHTHALMOLOGY

## 2020-08-03 PROCEDURE — 92014 COMPRE OPH EXAM EST PT 1/>: CPT | Performed by: OPHTHALMOLOGY

## 2020-08-03 PROCEDURE — 92202 OPSCPY EXTND ON/MAC DRAW: CPT | Performed by: OPHTHALMOLOGY

## 2020-08-03 ASSESSMENT — REFRACTION_AUTOREFRACTION
OS_CYLINDER: -0.75
OD_SPHERE: +1.50
OS_AXIS: 64
OS_SPHERE: +0.25
OD_AXIS: 104
OD_CYLINDER: -1.25

## 2020-08-03 ASSESSMENT — LID EXAM ASSESSMENTS
OS_BLEPHARITIS: LLL LUL T
OD_BLEPHARITIS: RLL RUL T

## 2020-08-03 ASSESSMENT — REFRACTION_MANIFEST
OS_SPHERE: +0.50
OS_VA1: 20/25
OD_ADD: +2.50
OS_CYLINDER: -1.00
OD_SPHERE: +1.00
OD_AXIS: 105
OS_AXIS: 085
OS_VA2: 20/25
OD_VA2: 20/25
OD_VA1: 20/25
OD_CYLINDER: -1.00
OS_ADD: +2.50

## 2020-08-03 ASSESSMENT — SPHEQUIV_DERIVED
OD_SPHEQUIV: 0.875
OS_SPHEQUIV: 0
OD_SPHEQUIV: 0.5
OS_SPHEQUIV: -0.125

## 2020-08-03 ASSESSMENT — CONFRONTATIONAL VISUAL FIELD TEST (CVF)
OS_FINDINGS: FULL
OD_FINDINGS: FULL

## 2020-08-03 ASSESSMENT — REFRACTION_CURRENTRX
OD_OVR_VA: 20/
OS_OVR_VA: 20/

## 2020-08-03 ASSESSMENT — VISUAL ACUITY
OD_BCVA: 20/70+2
OS_BCVA: 20/50-2

## 2020-08-07 ENCOUNTER — DOCTOR'S OFFICE (OUTPATIENT)
Dept: URBAN - NONMETROPOLITAN AREA CLINIC 1 | Facility: CLINIC | Age: 70
Setting detail: OPHTHALMOLOGY
End: 2020-08-07
Payer: COMMERCIAL

## 2020-08-07 DIAGNOSIS — H53.122: ICD-10-CM

## 2020-08-07 DIAGNOSIS — H25.11: ICD-10-CM

## 2020-08-07 DIAGNOSIS — H53.121: ICD-10-CM

## 2020-08-07 DIAGNOSIS — H25.12: ICD-10-CM

## 2020-08-07 PROCEDURE — 92083 EXTENDED VISUAL FIELD XM: CPT | Performed by: OPHTHALMOLOGY

## 2020-08-07 PROCEDURE — 92014 COMPRE OPH EXAM EST PT 1/>: CPT | Performed by: OPHTHALMOLOGY

## 2020-08-07 ASSESSMENT — CONFRONTATIONAL VISUAL FIELD TEST (CVF)
OD_FINDINGS: FULL
OS_FINDINGS: FULL

## 2020-08-07 ASSESSMENT — VISUAL ACUITY
OS_BCVA: 20/60-1
OD_BCVA: 20/25

## 2020-08-07 ASSESSMENT — REFRACTION_MANIFEST
OS_CYLINDER: -1.00
OS_VA2: 20/25
OS_SPHERE: +0.50
OS_VA1: 20/25
OD_VA2: 20/25
OS_AXIS: 085
OS_ADD: +2.50
OD_CYLINDER: -1.00
OD_ADD: +2.50
OD_SPHERE: +1.00
OD_AXIS: 105
OD_VA1: 20/25

## 2020-08-07 ASSESSMENT — SPHEQUIV_DERIVED
OD_SPHEQUIV: 0.5
OD_SPHEQUIV: 0.875
OS_SPHEQUIV: 0.5
OS_SPHEQUIV: 0

## 2020-08-07 ASSESSMENT — REFRACTION_AUTOREFRACTION
OS_AXIS: 094
OS_SPHERE: +1.50
OD_CYLINDER: -1.25
OD_AXIS: 098
OS_CYLINDER: -2.00
OD_SPHERE: +1.50

## 2020-08-07 ASSESSMENT — REFRACTION_CURRENTRX
OS_OVR_VA: 20/
OD_OVR_VA: 20/

## 2020-08-07 ASSESSMENT — LID EXAM ASSESSMENTS
OD_BLEPHARITIS: RLL RUL T
OS_BLEPHARITIS: LLL LUL T

## 2020-10-02 ENCOUNTER — DOCTOR'S OFFICE (OUTPATIENT)
Dept: URBAN - NONMETROPOLITAN AREA CLINIC 1 | Facility: CLINIC | Age: 70
Setting detail: OPHTHALMOLOGY
End: 2020-10-02
Payer: COMMERCIAL

## 2020-10-02 DIAGNOSIS — H35.3132: ICD-10-CM

## 2020-10-02 DIAGNOSIS — H34.8322: ICD-10-CM

## 2020-10-02 DIAGNOSIS — H25.12: ICD-10-CM

## 2020-10-02 DIAGNOSIS — H25.11: ICD-10-CM

## 2020-10-02 PROCEDURE — 92202 OPSCPY EXTND ON/MAC DRAW: CPT | Performed by: OPHTHALMOLOGY

## 2020-10-02 PROCEDURE — 92014 COMPRE OPH EXAM EST PT 1/>: CPT | Performed by: OPHTHALMOLOGY

## 2020-10-02 PROCEDURE — 92134 CPTRZ OPH DX IMG PST SGM RTA: CPT | Performed by: OPHTHALMOLOGY

## 2020-10-02 ASSESSMENT — LID EXAM ASSESSMENTS
OD_BLEPHARITIS: RLL RUL T
OS_BLEPHARITIS: LLL LUL T

## 2020-10-02 ASSESSMENT — CONFRONTATIONAL VISUAL FIELD TEST (CVF)
OD_FINDINGS: FULL
OS_FINDINGS: FULL

## 2020-10-09 ASSESSMENT — REFRACTION_CURRENTRX
OS_OVR_VA: 20/
OD_OVR_VA: 20/

## 2020-10-09 ASSESSMENT — SPHEQUIV_DERIVED
OD_SPHEQUIV: 0.875
OS_SPHEQUIV: 0
OS_SPHEQUIV: 0.5
OD_SPHEQUIV: 0.5

## 2020-10-09 ASSESSMENT — REFRACTION_MANIFEST
OD_CYLINDER: -1.00
OD_VA2: 20/25
OD_AXIS: 105
OS_ADD: +2.50
OD_ADD: +2.50
OD_VA1: 20/25
OS_CYLINDER: -1.00
OS_VA2: 20/25
OS_AXIS: 085
OS_VA1: 20/25
OD_SPHERE: +1.00
OS_SPHERE: +0.50

## 2020-10-09 ASSESSMENT — REFRACTION_AUTOREFRACTION
OD_AXIS: 098
OD_CYLINDER: -1.25
OD_SPHERE: +1.50
OS_SPHERE: +1.50
OS_AXIS: 094
OS_CYLINDER: -2.00

## 2020-10-09 ASSESSMENT — VISUAL ACUITY
OD_BCVA: 20/40+2
OS_BCVA: 20/30-2

## 2020-10-19 ENCOUNTER — DOCTOR'S OFFICE (OUTPATIENT)
Dept: URBAN - NONMETROPOLITAN AREA CLINIC 1 | Facility: CLINIC | Age: 70
Setting detail: OPHTHALMOLOGY
End: 2020-10-19
Payer: COMMERCIAL

## 2020-10-19 DIAGNOSIS — H35.3132: ICD-10-CM

## 2020-10-19 DIAGNOSIS — H25.11: ICD-10-CM

## 2020-10-19 DIAGNOSIS — H53.121: ICD-10-CM

## 2020-10-19 DIAGNOSIS — H25.12: ICD-10-CM

## 2020-10-19 DIAGNOSIS — H34.8322: ICD-10-CM

## 2020-10-19 DIAGNOSIS — H53.122: ICD-10-CM

## 2020-10-19 PROCEDURE — 92202 OPSCPY EXTND ON/MAC DRAW: CPT | Performed by: OPHTHALMOLOGY

## 2020-10-19 PROCEDURE — 92014 COMPRE OPH EXAM EST PT 1/>: CPT | Performed by: OPHTHALMOLOGY

## 2020-10-19 PROCEDURE — 92134 CPTRZ OPH DX IMG PST SGM RTA: CPT | Performed by: OPHTHALMOLOGY

## 2020-10-19 ASSESSMENT — REFRACTION_MANIFEST
OD_ADD: +2.50
OS_VA1: 20/25
OS_SPHERE: +0.50
OD_VA2: 20/25
OS_VA2: 20/25
OD_VA1: 20/25
OD_CYLINDER: -1.00
OS_AXIS: 085
OD_AXIS: 105
OS_ADD: +2.50
OD_SPHERE: +1.00
OS_CYLINDER: -1.00

## 2020-10-19 ASSESSMENT — SPHEQUIV_DERIVED
OS_SPHEQUIV: 0
OS_SPHEQUIV: 0.5
OD_SPHEQUIV: 0.5
OD_SPHEQUIV: 0.875

## 2020-10-19 ASSESSMENT — REFRACTION_AUTOREFRACTION
OD_CYLINDER: -1.25
OS_CYLINDER: -2.00
OD_SPHERE: +1.50
OS_SPHERE: +1.50
OD_AXIS: 098
OS_AXIS: 094

## 2020-10-19 ASSESSMENT — CONFRONTATIONAL VISUAL FIELD TEST (CVF)
OD_FINDINGS: FULL
OS_FINDINGS: FULL

## 2020-10-19 ASSESSMENT — REFRACTION_CURRENTRX
OS_OVR_VA: 20/
OD_OVR_VA: 20/

## 2020-10-19 ASSESSMENT — VISUAL ACUITY
OS_BCVA: 20/50+2
OD_BCVA: 20/40-1

## 2020-10-29 ENCOUNTER — DOCTOR'S OFFICE (OUTPATIENT)
Dept: URBAN - NONMETROPOLITAN AREA CLINIC 1 | Facility: CLINIC | Age: 70
Setting detail: OPHTHALMOLOGY
End: 2020-10-29
Payer: COMMERCIAL

## 2020-10-29 DIAGNOSIS — H34.8322: ICD-10-CM

## 2020-10-29 DIAGNOSIS — H53.122: ICD-10-CM

## 2020-10-29 DIAGNOSIS — H35.3132: ICD-10-CM

## 2020-10-29 DIAGNOSIS — H25.12: ICD-10-CM

## 2020-10-29 DIAGNOSIS — H25.11: ICD-10-CM

## 2020-10-29 DIAGNOSIS — H53.121: ICD-10-CM

## 2020-10-29 PROCEDURE — 92235 FLUORESCEIN ANGRPH MLTIFRAME: CPT | Performed by: OPHTHALMOLOGY

## 2020-10-29 PROCEDURE — 92250 FUNDUS PHOTOGRAPHY W/I&R: CPT | Performed by: OPHTHALMOLOGY

## 2020-10-29 PROCEDURE — 92014 COMPRE OPH EXAM EST PT 1/>: CPT | Performed by: OPHTHALMOLOGY

## 2020-10-29 ASSESSMENT — REFRACTION_AUTOREFRACTION
OS_AXIS: 094
OS_SPHERE: +1.50
OD_CYLINDER: -1.25
OD_SPHERE: +1.50
OS_CYLINDER: -2.00
OD_AXIS: 098

## 2020-10-29 ASSESSMENT — CONFRONTATIONAL VISUAL FIELD TEST (CVF)
OD_FINDINGS: FULL
OS_FINDINGS: FULL

## 2020-10-29 ASSESSMENT — REFRACTION_MANIFEST
OS_CYLINDER: -1.00
OD_AXIS: 105
OS_ADD: +2.50
OD_SPHERE: +1.00
OD_VA1: 20/25
OS_VA2: 20/25
OD_VA2: 20/25
OS_VA1: 20/25
OD_CYLINDER: -1.00
OD_ADD: +2.50
OS_AXIS: 085
OS_SPHERE: +0.50

## 2020-10-29 ASSESSMENT — SPHEQUIV_DERIVED
OD_SPHEQUIV: 0.875
OD_SPHEQUIV: 0.5
OS_SPHEQUIV: 0.5
OS_SPHEQUIV: 0

## 2020-10-29 ASSESSMENT — REFRACTION_CURRENTRX
OS_OVR_VA: 20/
OD_OVR_VA: 20/

## 2020-10-29 ASSESSMENT — VISUAL ACUITY
OS_BCVA: 20/50+2
OD_BCVA: 20/40-1

## 2020-11-16 ENCOUNTER — DOCTOR'S OFFICE (OUTPATIENT)
Dept: URBAN - NONMETROPOLITAN AREA CLINIC 1 | Facility: CLINIC | Age: 70
Setting detail: OPHTHALMOLOGY
End: 2020-11-16
Payer: COMMERCIAL

## 2020-11-16 DIAGNOSIS — H34.8322: ICD-10-CM

## 2020-11-16 DIAGNOSIS — H40.051: ICD-10-CM

## 2020-11-16 DIAGNOSIS — H25.11: ICD-10-CM

## 2020-11-16 DIAGNOSIS — H53.122: ICD-10-CM

## 2020-11-16 DIAGNOSIS — H35.3132: ICD-10-CM

## 2020-11-16 DIAGNOSIS — H53.121: ICD-10-CM

## 2020-11-16 DIAGNOSIS — H25.12: ICD-10-CM

## 2020-11-16 PROCEDURE — 92014 COMPRE OPH EXAM EST PT 1/>: CPT | Performed by: OPHTHALMOLOGY

## 2020-11-16 PROCEDURE — 92134 CPTRZ OPH DX IMG PST SGM RTA: CPT | Performed by: OPHTHALMOLOGY

## 2020-11-16 PROCEDURE — 92202 OPSCPY EXTND ON/MAC DRAW: CPT | Performed by: OPHTHALMOLOGY

## 2020-11-16 ASSESSMENT — REFRACTION_MANIFEST
OS_AXIS: 085
OS_SPHERE: +0.50
OD_AXIS: 105
OS_CYLINDER: -1.00
OD_CYLINDER: -1.00
OS_VA2: 20/25
OD_VA1: 20/25
OS_ADD: +2.50
OD_ADD: +2.50
OS_VA1: 20/25
OD_SPHERE: +1.00
OD_VA2: 20/25

## 2020-11-16 ASSESSMENT — SPHEQUIV_DERIVED
OS_SPHEQUIV: 0.5
OD_SPHEQUIV: 0.875
OD_SPHEQUIV: 0.5
OS_SPHEQUIV: 0

## 2020-11-16 ASSESSMENT — VISUAL ACUITY
OD_BCVA: 20/40-2
OS_BCVA: 20/40-1

## 2020-11-16 ASSESSMENT — REFRACTION_AUTOREFRACTION
OS_SPHERE: +1.50
OS_CYLINDER: -2.00
OD_SPHERE: +1.50
OD_CYLINDER: -1.25
OS_AXIS: 094
OD_AXIS: 098

## 2020-11-16 ASSESSMENT — REFRACTION_CURRENTRX
OD_OVR_VA: 20/
OS_OVR_VA: 20/

## 2020-11-16 ASSESSMENT — CONFRONTATIONAL VISUAL FIELD TEST (CVF)
OD_FINDINGS: FULL
OS_FINDINGS: FULL

## 2020-12-21 ENCOUNTER — DOCTOR'S OFFICE (OUTPATIENT)
Dept: URBAN - NONMETROPOLITAN AREA CLINIC 1 | Facility: CLINIC | Age: 70
Setting detail: OPHTHALMOLOGY
End: 2020-12-21
Payer: COMMERCIAL

## 2020-12-21 DIAGNOSIS — H35.3132: ICD-10-CM

## 2020-12-21 DIAGNOSIS — H34.8322: ICD-10-CM

## 2020-12-21 DIAGNOSIS — H53.122: ICD-10-CM

## 2020-12-21 DIAGNOSIS — H25.11: ICD-10-CM

## 2020-12-21 DIAGNOSIS — H40.051: ICD-10-CM

## 2020-12-21 DIAGNOSIS — H25.12: ICD-10-CM

## 2020-12-21 DIAGNOSIS — H53.121: ICD-10-CM

## 2020-12-21 PROCEDURE — 92133 CPTRZD OPH DX IMG PST SGM ON: CPT | Performed by: OPHTHALMOLOGY

## 2020-12-21 PROCEDURE — 92014 COMPRE OPH EXAM EST PT 1/>: CPT | Performed by: OPHTHALMOLOGY

## 2020-12-21 PROCEDURE — 76514 ECHO EXAM OF EYE THICKNESS: CPT | Performed by: OPHTHALMOLOGY

## 2020-12-21 ASSESSMENT — CONFRONTATIONAL VISUAL FIELD TEST (CVF)
OD_FINDINGS: FULL
OS_FINDINGS: FULL

## 2020-12-21 ASSESSMENT — REFRACTION_MANIFEST
OD_AXIS: 105
OD_SPHERE: +1.00
OS_ADD: +2.50
OS_VA1: 20/25
OD_VA1: 20/25
OS_SPHERE: +0.50
OS_VA2: 20/25
OD_CYLINDER: -1.00
OD_VA2: 20/25
OS_AXIS: 085
OS_CYLINDER: -1.00
OD_ADD: +2.50

## 2020-12-21 ASSESSMENT — PACHYMETRY
OD_CT_CORRECTION: -1
OD_CT_UM: 557
OS_CT_CORRECTION: -1
OS_CT_UM: 553

## 2020-12-21 ASSESSMENT — SPHEQUIV_DERIVED
OS_SPHEQUIV: 0
OD_SPHEQUIV: 0.875
OS_SPHEQUIV: 0.5
OD_SPHEQUIV: 0.5

## 2020-12-21 ASSESSMENT — REFRACTION_AUTOREFRACTION
OS_SPHERE: +1.50
OS_AXIS: 094
OD_SPHERE: +1.50
OD_AXIS: 098
OD_CYLINDER: -1.25
OS_CYLINDER: -2.00

## 2020-12-21 ASSESSMENT — REFRACTION_CURRENTRX
OD_OVR_VA: 20/
OS_OVR_VA: 20/

## 2020-12-21 ASSESSMENT — VISUAL ACUITY
OS_BCVA: 20/50-1
OD_BCVA: 20/40-1

## 2021-01-18 ENCOUNTER — DOCTOR'S OFFICE (OUTPATIENT)
Dept: URBAN - NONMETROPOLITAN AREA CLINIC 1 | Facility: CLINIC | Age: 71
Setting detail: OPHTHALMOLOGY
End: 2021-01-18
Payer: COMMERCIAL

## 2021-01-18 DIAGNOSIS — H25.12: ICD-10-CM

## 2021-01-18 DIAGNOSIS — H35.3132: ICD-10-CM

## 2021-01-18 DIAGNOSIS — H25.11: ICD-10-CM

## 2021-01-18 DIAGNOSIS — H04.123: ICD-10-CM

## 2021-01-18 DIAGNOSIS — H34.8322: ICD-10-CM

## 2021-01-18 DIAGNOSIS — H53.122: ICD-10-CM

## 2021-01-18 DIAGNOSIS — H40.051: ICD-10-CM

## 2021-01-18 DIAGNOSIS — H53.121: ICD-10-CM

## 2021-01-18 PROCEDURE — 83861 MICROFLUID ANALY TEARS: CPT | Performed by: OPHTHALMOLOGY

## 2021-01-18 PROCEDURE — 92134 CPTRZ OPH DX IMG PST SGM RTA: CPT | Performed by: OPHTHALMOLOGY

## 2021-01-18 PROCEDURE — 92014 COMPRE OPH EXAM EST PT 1/>: CPT | Performed by: OPHTHALMOLOGY

## 2021-01-18 PROCEDURE — 92202 OPSCPY EXTND ON/MAC DRAW: CPT | Performed by: OPHTHALMOLOGY

## 2021-01-18 ASSESSMENT — REFRACTION_MANIFEST
OS_VA1: 20/25
OD_CYLINDER: -1.00
OS_SPHERE: +0.50
OD_VA2: 20/25
OS_AXIS: 085
OS_ADD: +2.50
OD_SPHERE: +1.00
OD_AXIS: 105
OS_CYLINDER: -1.00
OD_VA1: 20/25
OS_VA2: 20/25
OD_ADD: +2.50

## 2021-01-18 ASSESSMENT — PACHYMETRY
OS_CT_CORRECTION: -1
OS_CT_UM: 553
OD_CT_CORRECTION: -1
OD_CT_UM: 557

## 2021-01-18 ASSESSMENT — REFRACTION_CURRENTRX
OS_OVR_VA: 20/
OD_OVR_VA: 20/

## 2021-01-18 ASSESSMENT — VISUAL ACUITY
OD_BCVA: 20/50
OS_BCVA: 20/30

## 2021-01-18 ASSESSMENT — REFRACTION_AUTOREFRACTION
OD_SPHERE: +1.50
OD_CYLINDER: -1.25
OS_AXIS: 094
OS_CYLINDER: -2.00
OS_SPHERE: +1.50
OD_AXIS: 098

## 2021-01-18 ASSESSMENT — SPHEQUIV_DERIVED
OD_SPHEQUIV: 0.5
OS_SPHEQUIV: 0.5
OD_SPHEQUIV: 0.875
OS_SPHEQUIV: 0

## 2021-01-18 ASSESSMENT — CONFRONTATIONAL VISUAL FIELD TEST (CVF)
OD_FINDINGS: FULL
OS_FINDINGS: FULL

## 2021-02-15 ENCOUNTER — DOCTOR'S OFFICE (OUTPATIENT)
Dept: URBAN - NONMETROPOLITAN AREA CLINIC 1 | Facility: CLINIC | Age: 71
Setting detail: OPHTHALMOLOGY
End: 2021-02-15
Payer: COMMERCIAL

## 2021-02-15 DIAGNOSIS — H40.051: ICD-10-CM

## 2021-02-15 PROCEDURE — 92012 INTRM OPH EXAM EST PATIENT: CPT | Performed by: OPHTHALMOLOGY

## 2021-02-15 ASSESSMENT — REFRACTION_MANIFEST
OD_VA1: 20/25
OD_CYLINDER: -1.00
OS_SPHERE: +0.50
OD_AXIS: 105
OS_VA1: 20/25
OS_CYLINDER: -1.00
OS_AXIS: 085
OS_VA2: 20/25
OD_SPHERE: +1.00
OD_VA2: 20/25
OD_ADD: +2.50
OS_ADD: +2.50

## 2021-02-15 ASSESSMENT — CONFRONTATIONAL VISUAL FIELD TEST (CVF)
OS_FINDINGS: FULL
OD_FINDINGS: FULL

## 2021-02-15 ASSESSMENT — REFRACTION_AUTOREFRACTION
OD_CYLINDER: -1.25
OS_AXIS: 094
OD_SPHERE: +1.50
OD_AXIS: 098
OS_CYLINDER: -2.00
OS_SPHERE: +1.50

## 2021-02-15 ASSESSMENT — SPHEQUIV_DERIVED
OD_SPHEQUIV: 0.875
OS_SPHEQUIV: 0.5
OD_SPHEQUIV: 0.5
OS_SPHEQUIV: 0

## 2021-02-15 ASSESSMENT — REFRACTION_CURRENTRX
OD_OVR_VA: 20/
OS_OVR_VA: 20/

## 2021-02-15 ASSESSMENT — VISUAL ACUITY
OD_BCVA: 20/40-1
OS_BCVA: 20/40-1

## 2021-03-11 ENCOUNTER — DOCTOR'S OFFICE (OUTPATIENT)
Dept: URBAN - NONMETROPOLITAN AREA CLINIC 1 | Facility: CLINIC | Age: 71
Setting detail: OPHTHALMOLOGY
End: 2021-03-11
Payer: COMMERCIAL

## 2021-03-11 DIAGNOSIS — H04.123: ICD-10-CM

## 2021-03-11 DIAGNOSIS — H35.3132: ICD-10-CM

## 2021-03-11 DIAGNOSIS — H40.051: ICD-10-CM

## 2021-03-11 DIAGNOSIS — H25.12: ICD-10-CM

## 2021-03-11 DIAGNOSIS — H34.8322: ICD-10-CM

## 2021-03-11 DIAGNOSIS — H25.11: ICD-10-CM

## 2021-03-11 PROCEDURE — 92202 OPSCPY EXTND ON/MAC DRAW: CPT | Performed by: OPHTHALMOLOGY

## 2021-03-11 PROCEDURE — 92134 CPTRZ OPH DX IMG PST SGM RTA: CPT | Performed by: OPHTHALMOLOGY

## 2021-03-11 PROCEDURE — 92014 COMPRE OPH EXAM EST PT 1/>: CPT | Performed by: OPHTHALMOLOGY

## 2021-03-11 ASSESSMENT — REFRACTION_MANIFEST
OS_CYLINDER: -1.00
OD_AXIS: 105
OS_VA1: 20/25
OD_VA1: 20/25
OS_AXIS: 085
OD_ADD: +2.50
OS_SPHERE: +0.50
OS_VA2: 20/25
OD_SPHERE: +1.00
OD_VA2: 20/25
OS_ADD: +2.50
OD_CYLINDER: -1.00

## 2021-03-11 ASSESSMENT — SPHEQUIV_DERIVED
OS_SPHEQUIV: 0
OD_SPHEQUIV: 0.5
OD_SPHEQUIV: 0.875
OS_SPHEQUIV: 0.5

## 2021-03-11 ASSESSMENT — REFRACTION_AUTOREFRACTION
OD_CYLINDER: -1.25
OD_SPHERE: +1.50
OS_CYLINDER: -2.00
OS_AXIS: 094
OD_AXIS: 098
OS_SPHERE: +1.50

## 2021-03-11 ASSESSMENT — REFRACTION_CURRENTRX
OS_OVR_VA: 20/
OD_OVR_VA: 20/

## 2021-03-11 ASSESSMENT — VISUAL ACUITY
OS_BCVA: 20/30-2
OD_BCVA: 20/30-2

## 2021-03-11 ASSESSMENT — CONFRONTATIONAL VISUAL FIELD TEST (CVF)
OS_FINDINGS: FULL
OD_FINDINGS: FULL

## 2021-06-03 ENCOUNTER — DOCTOR'S OFFICE (OUTPATIENT)
Dept: URBAN - NONMETROPOLITAN AREA CLINIC 1 | Facility: CLINIC | Age: 71
Setting detail: OPHTHALMOLOGY
End: 2021-06-03
Payer: COMMERCIAL

## 2021-06-03 DIAGNOSIS — H40.051: ICD-10-CM

## 2021-06-03 DIAGNOSIS — H35.3132: ICD-10-CM

## 2021-06-03 DIAGNOSIS — H25.12: ICD-10-CM

## 2021-06-03 DIAGNOSIS — H04.123: ICD-10-CM

## 2021-06-03 DIAGNOSIS — H25.11: ICD-10-CM

## 2021-06-03 DIAGNOSIS — H34.8322: ICD-10-CM

## 2021-06-03 PROCEDURE — 92014 COMPRE OPH EXAM EST PT 1/>: CPT | Performed by: OPHTHALMOLOGY

## 2021-06-03 PROCEDURE — 92202 OPSCPY EXTND ON/MAC DRAW: CPT | Performed by: OPHTHALMOLOGY

## 2021-06-03 PROCEDURE — 92134 CPTRZ OPH DX IMG PST SGM RTA: CPT | Performed by: OPHTHALMOLOGY

## 2021-06-03 ASSESSMENT — REFRACTION_MANIFEST
OS_VA1: 20/25
OS_AXIS: 085
OS_VA2: 20/25
OS_SPHERE: +0.50
OD_SPHERE: +1.00
OS_ADD: +2.50
OD_VA2: 20/25
OD_VA1: 20/25
OD_ADD: +2.50
OD_AXIS: 105
OS_CYLINDER: -1.00
OD_CYLINDER: -1.00

## 2021-06-03 ASSESSMENT — REFRACTION_CURRENTRX
OS_OVR_VA: 20/
OD_OVR_VA: 20/

## 2021-06-03 ASSESSMENT — SPHEQUIV_DERIVED
OD_SPHEQUIV: 0.875
OS_SPHEQUIV: 0.5
OD_SPHEQUIV: 0.5
OS_SPHEQUIV: 0

## 2021-06-03 ASSESSMENT — VISUAL ACUITY
OD_BCVA: 20/50
OS_BCVA: 20/40-1

## 2021-06-03 ASSESSMENT — REFRACTION_AUTOREFRACTION
OS_SPHERE: +1.50
OD_AXIS: 098
OD_SPHERE: +1.50
OS_CYLINDER: -2.00
OS_AXIS: 094
OD_CYLINDER: -1.25

## 2021-06-03 ASSESSMENT — CONFRONTATIONAL VISUAL FIELD TEST (CVF)
OD_FINDINGS: FULL
OS_FINDINGS: FULL

## 2021-07-15 ENCOUNTER — DOCTOR'S OFFICE (OUTPATIENT)
Dept: URBAN - NONMETROPOLITAN AREA CLINIC 1 | Facility: CLINIC | Age: 71
Setting detail: OPHTHALMOLOGY
End: 2021-07-15
Payer: COMMERCIAL

## 2021-07-15 ENCOUNTER — RX ONLY (RX ONLY)
Age: 71
End: 2021-07-15

## 2021-07-15 DIAGNOSIS — H34.8322: ICD-10-CM

## 2021-07-15 DIAGNOSIS — H40.051: ICD-10-CM

## 2021-07-15 DIAGNOSIS — H25.12: ICD-10-CM

## 2021-07-15 DIAGNOSIS — H35.3132: ICD-10-CM

## 2021-07-15 DIAGNOSIS — H25.11: ICD-10-CM

## 2021-07-15 PROCEDURE — 92134 CPTRZ OPH DX IMG PST SGM RTA: CPT | Performed by: OPHTHALMOLOGY

## 2021-07-15 PROCEDURE — 92202 OPSCPY EXTND ON/MAC DRAW: CPT | Performed by: OPHTHALMOLOGY

## 2021-07-15 PROCEDURE — 92014 COMPRE OPH EXAM EST PT 1/>: CPT | Performed by: OPHTHALMOLOGY

## 2021-07-15 ASSESSMENT — REFRACTION_CURRENTRX
OS_OVR_VA: 20/
OD_OVR_VA: 20/

## 2021-07-15 ASSESSMENT — REFRACTION_AUTOREFRACTION
OD_CYLINDER: -1.25
OS_CYLINDER: -2.00
OD_SPHERE: +1.50
OS_AXIS: 094
OS_SPHERE: +1.50
OD_AXIS: 098

## 2021-07-15 ASSESSMENT — REFRACTION_MANIFEST
OD_AXIS: 105
OD_SPHERE: +1.00
OS_CYLINDER: -1.00
OS_ADD: +2.50
OD_VA1: 20/25
OS_SPHERE: +0.50
OD_CYLINDER: -1.00
OS_VA2: 20/25
OS_AXIS: 085
OD_ADD: +2.50
OD_VA2: 20/25
OS_VA1: 20/25

## 2021-07-15 ASSESSMENT — VISUAL ACUITY
OD_BCVA: 20/60
OS_BCVA: 20/60

## 2021-07-15 ASSESSMENT — SPHEQUIV_DERIVED
OD_SPHEQUIV: 0.5
OS_SPHEQUIV: 0.5
OD_SPHEQUIV: 0.875
OS_SPHEQUIV: 0

## 2021-07-15 ASSESSMENT — CONFRONTATIONAL VISUAL FIELD TEST (CVF)
OD_FINDINGS: FULL
OS_FINDINGS: FULL

## 2021-07-22 ENCOUNTER — DOCTOR'S OFFICE (OUTPATIENT)
Dept: URBAN - NONMETROPOLITAN AREA CLINIC 1 | Facility: CLINIC | Age: 71
Setting detail: OPHTHALMOLOGY
End: 2021-07-22
Payer: COMMERCIAL

## 2021-07-22 DIAGNOSIS — H34.8322: ICD-10-CM

## 2021-07-22 DIAGNOSIS — H35.3132: ICD-10-CM

## 2021-07-22 PROCEDURE — 92014 COMPRE OPH EXAM EST PT 1/>: CPT | Performed by: OPHTHALMOLOGY

## 2021-07-22 PROCEDURE — 92250 FUNDUS PHOTOGRAPHY W/I&R: CPT | Performed by: OPHTHALMOLOGY

## 2021-07-22 PROCEDURE — 92235 FLUORESCEIN ANGRPH MLTIFRAME: CPT | Performed by: OPHTHALMOLOGY

## 2021-07-22 ASSESSMENT — CONFRONTATIONAL VISUAL FIELD TEST (CVF)
OD_FINDINGS: FULL
OS_FINDINGS: FULL

## 2021-07-22 ASSESSMENT — REFRACTION_MANIFEST
OD_CYLINDER: -1.00
OD_VA1: 20/25
OD_SPHERE: +1.00
OD_VA2: 20/25
OD_ADD: +2.50
OS_CYLINDER: -1.00
OS_ADD: +2.50
OS_VA2: 20/25
OD_AXIS: 105
OS_SPHERE: +0.50
OS_VA1: 20/25
OS_AXIS: 085

## 2021-07-22 ASSESSMENT — SPHEQUIV_DERIVED
OS_SPHEQUIV: 0.5
OS_SPHEQUIV: 0
OD_SPHEQUIV: 0.875
OD_SPHEQUIV: 0.5

## 2021-07-22 ASSESSMENT — REFRACTION_AUTOREFRACTION
OD_AXIS: 098
OS_AXIS: 094
OD_SPHERE: +1.50
OS_SPHERE: +1.50
OS_CYLINDER: -2.00
OD_CYLINDER: -1.25

## 2021-07-22 ASSESSMENT — VISUAL ACUITY
OD_BCVA: 20/60-2
OS_BCVA: 20/40-2

## 2021-07-22 ASSESSMENT — REFRACTION_CURRENTRX
OD_OVR_VA: 20/
OS_OVR_VA: 20/

## 2021-10-18 ENCOUNTER — DOCTOR'S OFFICE (OUTPATIENT)
Dept: URBAN - NONMETROPOLITAN AREA CLINIC 1 | Facility: CLINIC | Age: 71
Setting detail: OPHTHALMOLOGY
End: 2021-10-18
Payer: COMMERCIAL

## 2021-10-18 DIAGNOSIS — H25.12: ICD-10-CM

## 2021-10-18 DIAGNOSIS — H35.3132: ICD-10-CM

## 2021-10-18 DIAGNOSIS — H34.8322: ICD-10-CM

## 2021-10-18 DIAGNOSIS — H04.121: ICD-10-CM

## 2021-10-18 DIAGNOSIS — H40.051: ICD-10-CM

## 2021-10-18 DIAGNOSIS — H25.11: ICD-10-CM

## 2021-10-18 DIAGNOSIS — H04.122: ICD-10-CM

## 2021-10-18 PROCEDURE — 92202 OPSCPY EXTND ON/MAC DRAW: CPT | Performed by: OPHTHALMOLOGY

## 2021-10-18 PROCEDURE — 83861 MICROFLUID ANALY TEARS: CPT | Performed by: OPHTHALMOLOGY

## 2021-10-18 PROCEDURE — 99214 OFFICE O/P EST MOD 30 MIN: CPT | Performed by: OPHTHALMOLOGY

## 2021-10-18 PROCEDURE — 92134 CPTRZ OPH DX IMG PST SGM RTA: CPT | Performed by: OPHTHALMOLOGY

## 2021-10-18 ASSESSMENT — SPHEQUIV_DERIVED
OD_SPHEQUIV: 0.5
OD_SPHEQUIV: 0.875
OS_SPHEQUIV: 0
OS_SPHEQUIV: 0.5

## 2021-10-18 ASSESSMENT — REFRACTION_AUTOREFRACTION
OD_CYLINDER: -1.25
OS_CYLINDER: -2.00
OS_SPHERE: +1.50
OD_AXIS: 098
OD_SPHERE: +1.50
OS_AXIS: 094

## 2021-10-18 ASSESSMENT — VISUAL ACUITY
OD_BCVA: 20/60+2
OS_BCVA: 20/40+1

## 2021-10-18 ASSESSMENT — REFRACTION_MANIFEST
OD_VA1: 20/25
OS_AXIS: 085
OD_VA2: 20/25
OD_SPHERE: +1.00
OD_CYLINDER: -1.00
OS_SPHERE: +0.50
OD_AXIS: 105
OS_VA2: 20/25
OD_ADD: +2.50
OS_CYLINDER: -1.00
OS_VA1: 20/25
OS_ADD: +2.50

## 2021-10-18 ASSESSMENT — REFRACTION_CURRENTRX
OD_OVR_VA: 20/
OS_OVR_VA: 20/

## 2021-10-18 ASSESSMENT — CONFRONTATIONAL VISUAL FIELD TEST (CVF)
OS_FINDINGS: FULL
OD_FINDINGS: FULL

## 2022-02-10 ENCOUNTER — DOCTOR'S OFFICE (OUTPATIENT)
Dept: URBAN - NONMETROPOLITAN AREA CLINIC 1 | Facility: CLINIC | Age: 72
Setting detail: OPHTHALMOLOGY
End: 2022-02-10
Payer: COMMERCIAL

## 2022-02-10 DIAGNOSIS — H04.122: ICD-10-CM

## 2022-02-10 DIAGNOSIS — H34.8322: ICD-10-CM

## 2022-02-10 DIAGNOSIS — H25.11: ICD-10-CM

## 2022-02-10 DIAGNOSIS — H04.121: ICD-10-CM

## 2022-02-10 DIAGNOSIS — H35.3132: ICD-10-CM

## 2022-02-10 DIAGNOSIS — H25.12: ICD-10-CM

## 2022-02-10 PROCEDURE — 99213 OFFICE O/P EST LOW 20 MIN: CPT | Performed by: OPHTHALMOLOGY

## 2022-02-10 PROCEDURE — 83861 MICROFLUID ANALY TEARS: CPT | Performed by: OPHTHALMOLOGY

## 2022-02-10 PROCEDURE — 92134 CPTRZ OPH DX IMG PST SGM RTA: CPT | Performed by: OPHTHALMOLOGY

## 2022-02-10 ASSESSMENT — REFRACTION_AUTOREFRACTION
OS_AXIS: 094
OS_SPHERE: +1.50
OD_SPHERE: +1.50
OD_CYLINDER: -1.25
OS_CYLINDER: -2.00
OD_AXIS: 098

## 2022-02-10 ASSESSMENT — REFRACTION_MANIFEST
OS_ADD: +2.50
OS_SPHERE: +0.50
OD_AXIS: 105
OD_VA2: 20/25
OS_VA1: 20/25
OS_VA2: 20/25
OS_CYLINDER: -1.00
OS_AXIS: 085
OD_ADD: +2.50
OD_VA1: 20/25
OD_SPHERE: +1.00
OD_CYLINDER: -1.00

## 2022-02-10 ASSESSMENT — VISUAL ACUITY
OS_BCVA: 20/30
OD_BCVA: 20/60

## 2022-02-10 ASSESSMENT — SPHEQUIV_DERIVED
OD_SPHEQUIV: 0.5
OS_SPHEQUIV: 0.5
OS_SPHEQUIV: 0
OD_SPHEQUIV: 0.875

## 2022-02-10 ASSESSMENT — CONFRONTATIONAL VISUAL FIELD TEST (CVF)
OS_FINDINGS: FULL
OD_FINDINGS: FULL

## 2022-02-10 ASSESSMENT — REFRACTION_CURRENTRX
OS_OVR_VA: 20/
OD_OVR_VA: 20/

## 2022-03-24 ENCOUNTER — TELEPHONE (OUTPATIENT)
Dept: UROLOGY | Facility: MEDICAL CENTER | Age: 72
End: 2022-03-24

## 2022-03-24 NOTE — TELEPHONE ENCOUNTER
Please Triage -   New Patient- Young Espinoza    What is the reason for the patients appointment? Patient's wife called stating patient referred for Elevated psa 4 92       Imaging/Lab Results:      Do we accept the patient's insurance or is the patient Self-Pay? Provider & Plan:   Member ID#: Has the patient had any previous urologist(s)? yes  Last year   Dr Parks        Have patient records been requested?in epic        Has the patient had any outside testing done?inepic       Does the patient have a personal history of cancer?no       Patient can be reached at :

## 2022-03-25 NOTE — TELEPHONE ENCOUNTER
Contacted patient and scheduled him for a new patient appointment in the [place] office with Josemanuel Jackson on 4/14/22 @ 1100  He was provided with office address

## 2022-04-14 ENCOUNTER — OFFICE VISIT (OUTPATIENT)
Dept: UROLOGY | Facility: CLINIC | Age: 72
End: 2022-04-14
Payer: MEDICARE

## 2022-04-14 VITALS
WEIGHT: 210 LBS | SYSTOLIC BLOOD PRESSURE: 118 MMHG | BODY MASS INDEX: 31.83 KG/M2 | HEIGHT: 68 IN | DIASTOLIC BLOOD PRESSURE: 70 MMHG

## 2022-04-14 DIAGNOSIS — R97.20 ELEVATED PSA: Primary | ICD-10-CM

## 2022-04-14 PROCEDURE — 99204 OFFICE O/P NEW MOD 45 MIN: CPT

## 2022-04-14 RX ORDER — LORAZEPAM 1 MG/1
1 TABLET ORAL 3 TIMES DAILY
COMMUNITY

## 2022-04-14 RX ORDER — SUCRALFATE 1 G/1
1 TABLET ORAL 3 TIMES DAILY PRN
COMMUNITY

## 2022-04-14 RX ORDER — FUROSEMIDE 20 MG/1
20 TABLET ORAL
COMMUNITY

## 2022-04-14 RX ORDER — PANTOPRAZOLE SODIUM 40 MG/1
40 TABLET, DELAYED RELEASE ORAL 2 TIMES DAILY
COMMUNITY
Start: 2021-11-11

## 2022-04-14 RX ORDER — CLONIDINE HYDROCHLORIDE 0.1 MG/1
0.1 TABLET ORAL 2 TIMES DAILY PRN
COMMUNITY

## 2022-04-14 RX ORDER — ACETAMINOPHEN 500 MG
500 TABLET ORAL EVERY 6 HOURS PRN
COMMUNITY

## 2022-04-14 RX ORDER — EZETIMIBE 10 MG/1
1 TABLET ORAL DAILY
COMMUNITY
Start: 2021-11-30

## 2022-04-14 RX ORDER — CHLORAL HYDRATE 500 MG
1000 CAPSULE ORAL
COMMUNITY

## 2022-04-14 RX ORDER — METRONIDAZOLE 7.5 MG/G
1 GEL TOPICAL 2 TIMES DAILY
COMMUNITY

## 2022-04-14 RX ORDER — PHENOBARBITAL WITH BELLADONNA ALKALOIDS 16.2; .1037; .0194; .0065 MG/5ML; MG/5ML; MG/5ML; MG/5ML
5 ELIXIR ORAL EVERY 6 HOURS PRN
COMMUNITY

## 2022-04-14 RX ORDER — CARVEDILOL 3.12 MG/1
6025 TABLET ORAL
COMMUNITY

## 2022-04-14 RX ORDER — B-COMPLEX WITH VITAMIN C
1 TABLET ORAL DAILY
COMMUNITY

## 2022-04-14 RX ORDER — BRIMONIDINE TARTRATE 0.15 %
1 DROPS OPHTHALMIC (EYE) 2 TIMES DAILY
COMMUNITY

## 2022-04-14 RX ORDER — CHLORAL HYDRATE 500 MG
1000 CAPSULE ORAL DAILY
COMMUNITY

## 2022-04-14 RX ORDER — ATORVASTATIN CALCIUM 40 MG/1
40 TABLET, FILM COATED ORAL DAILY
COMMUNITY
Start: 2021-07-13 | End: 2022-07-13

## 2022-04-14 RX ORDER — NIFEDIPINE 10 MG/1
10 CAPSULE ORAL DAILY
COMMUNITY

## 2022-04-14 NOTE — PROGRESS NOTES
4/14/2022    Chief Complaint   Patient presents with    Elevated PSA       Assessment and Plan    80 y o  male new patient to office    1  Elevated PSA  · Last PSA 4 92 on 02/24/2022  · See PSA trend below  · Rectal exam declined today  · Patient reports history of elevated PSA over the past 3-4 years  Previously managed by Dr Madina Green  · Patient reports he had a negative prostate biopsy in September of 2021 by Dr Madina Green  · Request made to obtain medical records from Dr Madina Green office to review prior treatment history  · I recommend repeating his PSA level in 6 months with follow-up to discuss further management of his elevated PSA  Patient is agreeable to plan and verbalize understanding  PSA TREND:  PSA:    4 92     On     2/24/2022  PSA:    4 90     On     8/26/2021  PSA:    5 05     On     12/3/2020        History of Present Illness  Steve Leslie is a 80 y o  male previously managed by Dr Madina Green for elevated PSA level  Patient reports he has had elevated PSA level over the past 3-4 years  Last PSA was 4 92 on 02/24/2022  Patient reports he had a negative prostate biopsy be done by Dr Madina Green in September of 2021  He also reports history of negative prostate biopsies in the past as well  Request for medical records to be sent to this office for review  Denies any family history of prostate cancer  He is a  and served in [employer]   He denies any lower urinary tract symptoms, reports occasional nocturia 1 time per night however is not bothered by this  He denies fever, chills, gross hematuria, abdominal pain, or flank pain  Rectal exam declined by patient in office today  Review of Systems   Constitutional: Negative for activity change, appetite change, chills, fatigue and fever  HENT: Negative for congestion and sore throat  Respiratory: Negative for cough and shortness of breath  Cardiovascular: Negative for chest pain and leg swelling     Gastrointestinal: Negative for abdominal pain, constipation, diarrhea, nausea and vomiting  Genitourinary: Negative for difficulty urinating, dysuria, flank pain, frequency, hematuria, penile pain, penile swelling, scrotal swelling, testicular pain and urgency  Musculoskeletal: Negative for back pain and gait problem  Skin: Negative for wound  Allergic/Immunologic: Negative for immunocompromised state  Neurological: Negative for dizziness, weakness and numbness  Hematological: Does not bruise/bleed easily  Vitals  Vitals:    04/14/22 1056   BP: 118/70   Weight: 95 3 kg (210 lb)   Height: 5' 8" (1 727 m)       Physical Exam  Vitals reviewed  Constitutional:       General: He is not in acute distress  Appearance: Normal appearance  He is not ill-appearing or toxic-appearing  HENT:      Head: Normocephalic and atraumatic  Eyes:      General: No scleral icterus  Conjunctiva/sclera: Conjunctivae normal    Cardiovascular:      Rate and Rhythm: Normal rate  Pulmonary:      Effort: Pulmonary effort is normal  No respiratory distress  Abdominal:      General: Abdomen is flat  Palpations: Abdomen is soft  Tenderness: There is no abdominal tenderness  There is no right CVA tenderness or left CVA tenderness  Hernia: No hernia is present  Genitourinary:     Comments: YU declined by patient  Musculoskeletal:      Cervical back: Normal range of motion  Right lower leg: No edema  Left lower leg: No edema  Skin:     General: Skin is warm and dry  Coloration: Skin is not jaundiced or pale  Neurological:      General: No focal deficit present  Mental Status: He is alert and oriented to person, place, and time  Mental status is at baseline  Gait: Gait normal    Psychiatric:         Mood and Affect: Mood normal          Behavior: Behavior normal          Thought Content: Thought content normal          Judgment: Judgment normal          Past History  History reviewed   No pertinent past medical history  Social History     Socioeconomic History    Marital status: /Civil Union     Spouse name: None    Number of children: None    Years of education: None    Highest education level: None   Occupational History    None   Tobacco Use    Smoking status: None    Smokeless tobacco: None   Substance and Sexual Activity    Alcohol use: None    Drug use: None    Sexual activity: None   Other Topics Concern    None   Social History Narrative    None     Social Determinants of Health     Financial Resource Strain: Not on file   Food Insecurity: Not on file   Transportation Needs: Not on file   Physical Activity: Not on file   Stress: Not on file   Social Connections: Not on file   Intimate Partner Violence: Not on file   Housing Stability: Not on file     Social History     Tobacco Use   Smoking Status Not on file   Smokeless Tobacco Not on file     History reviewed  No pertinent family history  The following portions of the patient's history were reviewed and updated as appropriate allergies, current medications, past medical history, past social history, past surgical history and problem list    Imaging:    Results  No results found for this or any previous visit (from the past 1 hour(s))  ]  No results found for: PSA  No results found for: GLUCOSE, CALCIUM, NA, K, CO2, CL, BUN, CREATININE  No results found for: WBC, HGB, HCT, MCV, PLT    ADINA Coats

## 2022-04-18 ENCOUNTER — DOCTOR'S OFFICE (OUTPATIENT)
Dept: URBAN - NONMETROPOLITAN AREA CLINIC 1 | Facility: CLINIC | Age: 72
Setting detail: OPHTHALMOLOGY
End: 2022-04-18
Payer: COMMERCIAL

## 2022-04-18 DIAGNOSIS — H34.8322: ICD-10-CM

## 2022-04-18 DIAGNOSIS — H35.3132: ICD-10-CM

## 2022-04-18 PROCEDURE — 99213 OFFICE O/P EST LOW 20 MIN: CPT | Performed by: OPHTHALMOLOGY

## 2022-04-18 PROCEDURE — 92134 CPTRZ OPH DX IMG PST SGM RTA: CPT | Performed by: OPHTHALMOLOGY

## 2022-04-18 ASSESSMENT — VISUAL ACUITY
OD_BCVA: 20/40-2
OS_BCVA: 20/30+2

## 2022-04-18 ASSESSMENT — CONFRONTATIONAL VISUAL FIELD TEST (CVF)
OD_FINDINGS: FULL
OS_FINDINGS: FULL

## 2022-04-18 ASSESSMENT — REFRACTION_MANIFEST
OD_CYLINDER: -1.00
OS_AXIS: 085
OS_CYLINDER: -1.00
OD_VA1: 20/25
OS_VA2: 20/25
OS_VA1: 20/25
OS_SPHERE: +0.50
OS_ADD: +2.50
OD_AXIS: 105
OD_SPHERE: +1.00
OD_VA2: 20/25
OD_ADD: +2.50

## 2022-04-18 ASSESSMENT — REFRACTION_AUTOREFRACTION
OD_CYLINDER: -1.25
OS_AXIS: 094
OD_SPHERE: +1.50
OS_SPHERE: +1.50
OS_CYLINDER: -2.00
OD_AXIS: 098

## 2022-04-18 ASSESSMENT — SPHEQUIV_DERIVED
OD_SPHEQUIV: 0.5
OD_SPHEQUIV: 0.875
OS_SPHEQUIV: 0
OS_SPHEQUIV: 0.5

## 2022-04-18 ASSESSMENT — REFRACTION_CURRENTRX
OD_OVR_VA: 20/
OS_OVR_VA: 20/

## 2022-04-29 ENCOUNTER — DOCTOR'S OFFICE (OUTPATIENT)
Dept: URBAN - NONMETROPOLITAN AREA CLINIC 1 | Facility: CLINIC | Age: 72
Setting detail: OPHTHALMOLOGY
End: 2022-04-29
Payer: COMMERCIAL

## 2022-04-29 DIAGNOSIS — H25.12: ICD-10-CM

## 2022-04-29 DIAGNOSIS — H34.8322: ICD-10-CM

## 2022-04-29 DIAGNOSIS — H35.3132: ICD-10-CM

## 2022-04-29 DIAGNOSIS — H25.11: ICD-10-CM

## 2022-04-29 PROCEDURE — 99213 OFFICE O/P EST LOW 20 MIN: CPT | Performed by: OPHTHALMOLOGY

## 2022-04-29 PROCEDURE — 92134 CPTRZ OPH DX IMG PST SGM RTA: CPT | Performed by: OPHTHALMOLOGY

## 2022-04-29 PROCEDURE — 92235 FLUORESCEIN ANGRPH MLTIFRAME: CPT | Performed by: OPHTHALMOLOGY

## 2022-04-29 ASSESSMENT — REFRACTION_CURRENTRX
OS_OVR_VA: 20/
OD_OVR_VA: 20/

## 2022-04-29 ASSESSMENT — REFRACTION_AUTOREFRACTION
OS_AXIS: 094
OS_CYLINDER: -2.00
OD_AXIS: 098
OD_CYLINDER: -1.25
OS_SPHERE: +1.50
OD_SPHERE: +1.50

## 2022-04-29 ASSESSMENT — REFRACTION_MANIFEST
OD_CYLINDER: -1.00
OS_ADD: +2.50
OS_CYLINDER: -1.00
OD_ADD: +2.50
OS_SPHERE: +0.50
OD_VA1: 20/25
OD_SPHERE: +1.00
OD_VA2: 20/25
OS_AXIS: 085
OS_VA1: 20/25
OS_VA2: 20/25
OD_AXIS: 105

## 2022-04-29 ASSESSMENT — VISUAL ACUITY
OS_BCVA: 20/30-1
OD_BCVA: 20/50+2

## 2022-04-29 ASSESSMENT — SPHEQUIV_DERIVED
OD_SPHEQUIV: 0.5
OS_SPHEQUIV: 0.5
OD_SPHEQUIV: 0.875
OS_SPHEQUIV: 0

## 2022-04-29 ASSESSMENT — CONFRONTATIONAL VISUAL FIELD TEST (CVF)
OS_FINDINGS: FULL
OD_FINDINGS: FULL

## 2022-05-17 ENCOUNTER — DOCTOR'S OFFICE (OUTPATIENT)
Dept: URBAN - NONMETROPOLITAN AREA CLINIC 1 | Facility: CLINIC | Age: 72
Setting detail: OPHTHALMOLOGY
End: 2022-05-17
Payer: COMMERCIAL

## 2022-05-17 DIAGNOSIS — H40.051: ICD-10-CM

## 2022-05-17 DIAGNOSIS — H43.811: ICD-10-CM

## 2022-05-17 DIAGNOSIS — H25.12: ICD-10-CM

## 2022-05-17 DIAGNOSIS — H35.3132: ICD-10-CM

## 2022-05-17 DIAGNOSIS — H34.8322: ICD-10-CM

## 2022-05-17 DIAGNOSIS — H25.11: ICD-10-CM

## 2022-05-17 PROCEDURE — 99214 OFFICE O/P EST MOD 30 MIN: CPT | Performed by: OPHTHALMOLOGY

## 2022-05-17 ASSESSMENT — SPHEQUIV_DERIVED
OS_SPHEQUIV: 0
OD_SPHEQUIV: 1.75
OD_SPHEQUIV: 0.5
OS_SPHEQUIV: 0.875

## 2022-05-17 ASSESSMENT — KERATOMETRY
OD_K2POWER_DIOPTERS: 45.75
OS_AXISANGLE_DEGREES: 154
OD_AXISANGLE_DEGREES: 044
OD_K1POWER_DIOPTERS: 45.25
OS_K2POWER_DIOPTERS: 46.00
OS_K1POWER_DIOPTERS: 45.50

## 2022-05-17 ASSESSMENT — REFRACTION_AUTOREFRACTION
OD_SPHERE: +2.75
OS_CYLINDER: -1.75
OS_SPHERE: +1.75
OD_AXIS: 099
OS_AXIS: 077
OD_CYLINDER: -2.00

## 2022-05-17 ASSESSMENT — AXIALLENGTH_DERIVED
OS_AL: 22.7934
OS_AL: 22.4796
OD_AL: 22.2557
OD_AL: 22.6977

## 2022-05-17 ASSESSMENT — REFRACTION_MANIFEST
OD_SPHERE: +1.00
OS_CYLINDER: -1.00
OD_CYLINDER: -1.00
OD_ADD: +2.50
OD_AXIS: 105
OD_VA2: 20/25
OS_SPHERE: +0.50
OS_ADD: +2.50
OS_VA1: 20/25
OS_AXIS: 085
OD_VA1: 20/25
OS_VA2: 20/25

## 2022-05-17 ASSESSMENT — CONFRONTATIONAL VISUAL FIELD TEST (CVF)
OD_FINDINGS: FULL
OS_FINDINGS: FULL

## 2022-05-17 ASSESSMENT — REFRACTION_CURRENTRX
OD_OVR_VA: 20/
OS_OVR_VA: 20/

## 2022-05-17 ASSESSMENT — VISUAL ACUITY
OD_BCVA: 20/40+2
OS_BCVA: 20/25-2

## 2022-07-19 ENCOUNTER — DOCTOR'S OFFICE (OUTPATIENT)
Dept: URBAN - NONMETROPOLITAN AREA CLINIC 1 | Facility: CLINIC | Age: 72
Setting detail: OPHTHALMOLOGY
End: 2022-07-19
Payer: COMMERCIAL

## 2022-07-19 DIAGNOSIS — H25.12: ICD-10-CM

## 2022-07-19 PROCEDURE — 92136 OPHTHALMIC BIOMETRY: CPT | Performed by: OPHTHALMOLOGY

## 2022-07-20 ENCOUNTER — AMBUL SURGICAL CARE (OUTPATIENT)
Dept: URBAN - NONMETROPOLITAN AREA SURGERY 1 | Facility: SURGERY | Age: 72
Setting detail: OPHTHALMOLOGY
End: 2022-07-20
Payer: COMMERCIAL

## 2022-07-20 DIAGNOSIS — H25.13: ICD-10-CM

## 2022-07-20 PROCEDURE — MISC CHARG MISC CHARGE: Performed by: OPHTHALMOLOGY

## 2022-07-25 ENCOUNTER — AMBUL SURGICAL CARE (OUTPATIENT)
Dept: URBAN - NONMETROPOLITAN AREA SURGERY 1 | Facility: SURGERY | Age: 72
Setting detail: OPHTHALMOLOGY
End: 2022-07-25
Payer: COMMERCIAL

## 2022-07-25 DIAGNOSIS — H25.012: ICD-10-CM

## 2022-07-25 DIAGNOSIS — H25.042: ICD-10-CM

## 2022-07-25 DIAGNOSIS — H25.12: ICD-10-CM

## 2022-07-25 PROCEDURE — G8907 PT DOC NO EVENTS ON DISCHARG: HCPCS | Performed by: OPHTHALMOLOGY

## 2022-07-25 PROCEDURE — G8918 PT W/O PREOP ORDER IV AB PRO: HCPCS | Performed by: OPHTHALMOLOGY

## 2022-07-25 PROCEDURE — G8907 PT DOC NO EVENTS ON DISCHARG: HCPCS | Performed by: CLINIC/CENTER

## 2022-07-25 PROCEDURE — 66984 XCAPSL CTRC RMVL W/O ECP: CPT | Performed by: OPHTHALMOLOGY

## 2022-07-25 PROCEDURE — V2788 PRESBYOPIA-CORRECT FUNCTION: HCPCS | Performed by: OPHTHALMOLOGY

## 2022-07-25 PROCEDURE — 66984 XCAPSL CTRC RMVL W/O ECP: CPT | Performed by: CLINIC/CENTER

## 2022-07-25 PROCEDURE — G8918 PT W/O PREOP ORDER IV AB PRO: HCPCS | Performed by: CLINIC/CENTER

## 2022-07-26 ENCOUNTER — DOCTOR'S OFFICE (OUTPATIENT)
Dept: URBAN - NONMETROPOLITAN AREA CLINIC 1 | Facility: CLINIC | Age: 72
Setting detail: OPHTHALMOLOGY
End: 2022-07-26
Payer: COMMERCIAL

## 2022-07-26 ENCOUNTER — RX ONLY (RX ONLY)
Age: 72
End: 2022-07-26

## 2022-07-26 DIAGNOSIS — Z96.1: ICD-10-CM

## 2022-07-26 DIAGNOSIS — H25.11: ICD-10-CM

## 2022-07-26 PROCEDURE — 92136 OPHTHALMIC BIOMETRY: CPT | Performed by: OPHTHALMOLOGY

## 2022-07-26 PROCEDURE — 99024 POSTOP FOLLOW-UP VISIT: CPT | Performed by: OPHTHALMOLOGY

## 2022-07-26 ASSESSMENT — REFRACTION_AUTOREFRACTION
OS_CYLINDER: -1.25
OS_AXIS: 027
OD_CYLINDER: -2.00
OS_SPHERE: +1.00
OD_AXIS: 094
OD_SPHERE: +2.00

## 2022-07-26 ASSESSMENT — AXIALLENGTH_DERIVED
OD_AL: 22.613
OS_AL: 22.4897
OS_AL: 22.6233
OD_AL: 22.4355

## 2022-07-26 ASSESSMENT — REFRACTION_MANIFEST
OS_CYLINDER: -1.00
OS_SPHERE: +0.50
OD_VA2: 20/25
OS_AXIS: 085
OD_CYLINDER: -1.00
OD_ADD: +2.50
OD_VA1: 20/25
OS_ADD: +2.50
OS_VA1: 20/25
OS_VA2: 20/25
OD_AXIS: 105
OD_SPHERE: +1.00

## 2022-07-26 ASSESSMENT — KERATOMETRY
OS_K2POWER_DIOPTERS: 46.75
OD_K1POWER_DIOPTERS: 45.50
OD_AXISANGLE_DEGREES: 030
OS_K1POWER_DIOPTERS: 45.75
OD_K2POWER_DIOPTERS: 46.00
OS_AXISANGLE_DEGREES: 127

## 2022-07-26 ASSESSMENT — REFRACTION_CURRENTRX
OD_OVR_VA: 20/
OS_OVR_VA: 20/

## 2022-07-26 ASSESSMENT — SPHEQUIV_DERIVED
OS_SPHEQUIV: 0.375
OS_SPHEQUIV: 0
OD_SPHEQUIV: 0.5
OD_SPHEQUIV: 1

## 2022-07-26 ASSESSMENT — VISUAL ACUITY
OS_BCVA: 20/30-2
OD_BCVA: 20/60-2

## 2022-08-02 ENCOUNTER — DOCTOR'S OFFICE (OUTPATIENT)
Dept: URBAN - NONMETROPOLITAN AREA CLINIC 1 | Facility: CLINIC | Age: 72
Setting detail: OPHTHALMOLOGY
End: 2022-08-02
Payer: COMMERCIAL

## 2022-08-02 DIAGNOSIS — Z96.1: ICD-10-CM

## 2022-08-02 DIAGNOSIS — H26.40: ICD-10-CM

## 2022-08-02 DIAGNOSIS — H25.11: ICD-10-CM

## 2022-08-02 PROCEDURE — 99024 POSTOP FOLLOW-UP VISIT: CPT | Performed by: OPHTHALMOLOGY

## 2022-08-02 ASSESSMENT — REFRACTION_CURRENTRX
OD_OVR_VA: 20/
OS_OVR_VA: 20/

## 2022-08-02 ASSESSMENT — REFRACTION_AUTOREFRACTION
OD_CYLINDER: -1.25
OD_AXIS: 088
OS_SPHERE: 0.00
OD_SPHERE: +1.50
OS_AXIS: 061
OS_CYLINDER: -1.00

## 2022-08-02 ASSESSMENT — KERATOMETRY
OD_K1POWER_DIOPTERS: 45.25
OD_AXISANGLE_DEGREES: 042
OD_K2POWER_DIOPTERS: 46.00
OS_AXISANGLE_DEGREES: 167
OS_K1POWER_DIOPTERS: 45.50
OS_K2POWER_DIOPTERS: 46.00

## 2022-08-02 ASSESSMENT — VISUAL ACUITY
OD_BCVA: 20/25-1
OS_BCVA: 20/20

## 2022-08-02 ASSESSMENT — REFRACTION_MANIFEST
OD_VA2: 20/25
OS_ADD: +2.50
OS_SPHERE: +0.50
OS_VA2: 20/25
OS_AXIS: 085
OS_VA1: 20/25
OD_SPHERE: +1.00
OD_ADD: +2.50
OD_VA1: 20/25
OD_CYLINDER: -1.00
OD_AXIS: 105
OS_CYLINDER: -1.00

## 2022-08-02 ASSESSMENT — AXIALLENGTH_DERIVED
OD_AL: 22.5214
OS_AL: 22.7934
OS_AL: 22.9767
OD_AL: 22.6553

## 2022-08-02 ASSESSMENT — SPHEQUIV_DERIVED
OD_SPHEQUIV: 0.875
OS_SPHEQUIV: 0
OS_SPHEQUIV: -0.5
OD_SPHEQUIV: 0.5

## 2022-08-02 ASSESSMENT — CONFRONTATIONAL VISUAL FIELD TEST (CVF)
OS_FINDINGS: FULL
OD_FINDINGS: FULL

## 2022-08-02 ASSESSMENT — DRY EYES - PHYSICIAN NOTES: OS_GENERALCOMMENTS: TRACE PEE

## 2022-08-24 ENCOUNTER — DOCTOR'S OFFICE (OUTPATIENT)
Dept: URBAN - NONMETROPOLITAN AREA CLINIC 1 | Facility: CLINIC | Age: 72
Setting detail: OPHTHALMOLOGY
End: 2022-08-24

## 2022-08-24 DIAGNOSIS — H26.40: ICD-10-CM

## 2022-08-24 DIAGNOSIS — H52.4: ICD-10-CM

## 2022-08-24 DIAGNOSIS — H25.11: ICD-10-CM

## 2022-08-24 DIAGNOSIS — H40.051: ICD-10-CM

## 2022-08-24 DIAGNOSIS — Z96.1: ICD-10-CM

## 2022-08-24 PROCEDURE — 99024 POSTOP FOLLOW-UP VISIT: CPT | Performed by: OPTOMETRIST

## 2022-08-24 PROCEDURE — NCRX N/C GLASSES RX: Performed by: OPTOMETRIST

## 2022-08-24 ASSESSMENT — PACHYMETRY
OD_CT_CORRECTION: -1
OS_CT_UM: 553
OS_CT_CORRECTION: -1
OD_CT_UM: 557

## 2022-08-24 ASSESSMENT — TONOMETRY
OD_IOP_MMHG: 18
OS_IOP_MMHG: 15

## 2022-08-24 ASSESSMENT — DRY EYES - PHYSICIAN NOTES: OS_GENERALCOMMENTS: TRACE PEE

## 2022-08-25 ASSESSMENT — KERATOMETRY
OS_K2POWER_DIOPTERS: 46.00
OD_AXISANGLE_DEGREES: 042
OS_AXISANGLE_DEGREES: 167
OS_K1POWER_DIOPTERS: 45.50
OD_K2POWER_DIOPTERS: 46.00
OD_K1POWER_DIOPTERS: 45.25

## 2022-08-25 ASSESSMENT — REFRACTION_MANIFEST
OD_VA2: 20/25+2
OD_AXIS: 105
OD_ADD: +2.50
OD_VA1: 20/25+2
OS_AXIS: 075
OS_VA2: 20/30-2
OS_CYLINDER: -0.75
OS_SPHERE: +0.25
OS_ADD: +0.75
OD_CYLINDER: -1.25
OS_VA1: 20/30-2
OD_SPHERE: +1.50

## 2022-08-25 ASSESSMENT — AXIALLENGTH_DERIVED
OD_AL: 22.389
OD_AL: 22.5214
OS_AL: 22.8847
OS_AL: 22.839

## 2022-08-25 ASSESSMENT — REFRACTION_CURRENTRX
OS_OVR_VA: 20/
OD_OVR_VA: 20/

## 2022-08-25 ASSESSMENT — REFRACTION_AUTOREFRACTION
OD_AXIS: 119
OD_SPHERE: +2.00
OS_AXIS: 072
OD_CYLINDER: -1.50
OS_SPHERE: +0.25
OS_CYLINDER: -1.00

## 2022-08-25 ASSESSMENT — VISUAL ACUITY
OD_BCVA: 20/40-2
OS_BCVA: 20/30-1

## 2022-08-25 ASSESSMENT — SPHEQUIV_DERIVED
OD_SPHEQUIV: 0.875
OS_SPHEQUIV: -0.125
OD_SPHEQUIV: 1.25
OS_SPHEQUIV: -0.25

## 2022-09-09 ENCOUNTER — RX ONLY (RX ONLY)
Age: 72
End: 2022-09-09

## 2022-09-09 ENCOUNTER — DOCTOR'S OFFICE (OUTPATIENT)
Dept: URBAN - NONMETROPOLITAN AREA CLINIC 1 | Facility: CLINIC | Age: 72
Setting detail: OPHTHALMOLOGY
End: 2022-09-09
Payer: COMMERCIAL

## 2022-09-09 DIAGNOSIS — H26.40: ICD-10-CM

## 2022-09-09 DIAGNOSIS — H25.11: ICD-10-CM

## 2022-09-09 DIAGNOSIS — Z96.1: ICD-10-CM

## 2022-09-09 DIAGNOSIS — H40.051: ICD-10-CM

## 2022-09-09 DIAGNOSIS — H35.3132: ICD-10-CM

## 2022-09-09 PROCEDURE — 99024 POSTOP FOLLOW-UP VISIT: CPT | Performed by: OPHTHALMOLOGY

## 2022-09-09 PROCEDURE — 92134 CPTRZ OPH DX IMG PST SGM RTA: CPT | Performed by: OPHTHALMOLOGY

## 2022-09-09 ASSESSMENT — REFRACTION_CURRENTRX
OS_OVR_VA: 20/
OD_OVR_VA: 20/

## 2022-09-09 ASSESSMENT — REFRACTION_MANIFEST
OS_SPHERE: +0.25
OD_VA2: 20/25+2
OS_VA1: 20/30-2
OS_CYLINDER: -0.75
OS_AXIS: 075
OD_VA1: 20/25+2
OD_CYLINDER: -1.25
OS_ADD: +0.75
OD_SPHERE: +1.50
OD_ADD: +2.50
OD_AXIS: 105
OS_VA2: 20/30-2

## 2022-09-09 ASSESSMENT — KERATOMETRY
OS_K1POWER_DIOPTERS: 45.50
OD_AXISANGLE_DEGREES: 042
OS_AXISANGLE_DEGREES: 167
OD_K2POWER_DIOPTERS: 46.00
OD_K1POWER_DIOPTERS: 45.25
OS_K2POWER_DIOPTERS: 46.25

## 2022-09-09 ASSESSMENT — AXIALLENGTH_DERIVED
OS_AL: 22.7055
OS_AL: 22.796
OD_AL: 22.1718
OD_AL: 22.5214

## 2022-09-09 ASSESSMENT — REFRACTION_AUTOREFRACTION
OD_CYLINDER: -1.25
OD_AXIS: 106
OS_AXIS: 070
OS_CYLINDER: -0.75
OS_SPHERE: +0.50
OD_SPHERE: +2.50

## 2022-09-09 ASSESSMENT — SPHEQUIV_DERIVED
OS_SPHEQUIV: -0.125
OD_SPHEQUIV: 0.875
OD_SPHEQUIV: 1.875
OS_SPHEQUIV: 0.125

## 2022-09-09 ASSESSMENT — VISUAL ACUITY
OS_BCVA: 20/30-1
OD_BCVA: 20/30-1

## 2022-09-09 ASSESSMENT — DRY EYES - PHYSICIAN NOTES: OS_GENERALCOMMENTS: TRACE PEE

## 2022-10-12 ENCOUNTER — DOCTOR'S OFFICE (OUTPATIENT)
Dept: URBAN - NONMETROPOLITAN AREA CLINIC 1 | Facility: CLINIC | Age: 72
Setting detail: OPHTHALMOLOGY
End: 2022-10-12
Payer: COMMERCIAL

## 2022-10-12 DIAGNOSIS — H34.8322: ICD-10-CM

## 2022-10-12 DIAGNOSIS — Z96.1: ICD-10-CM

## 2022-10-12 DIAGNOSIS — H25.11: ICD-10-CM

## 2022-10-12 DIAGNOSIS — H26.40: ICD-10-CM

## 2022-10-12 PROBLEM — H04.121 DRY EYE; RIGHT EYE, LEFT EYE: Status: ACTIVE | Noted: 2021-10-18

## 2022-10-12 PROBLEM — H04.123: Status: ACTIVE | Noted: 2019-09-19

## 2022-10-12 PROBLEM — H04.122 DRY EYE; RIGHT EYE, LEFT EYE: Status: ACTIVE | Noted: 2021-10-18

## 2022-10-12 PROBLEM — H35.3132 AGE RELATED MACULAR DEGENERATION DRY; BOTH EYES INTERMEDIATE: Status: ACTIVE | Noted: 2020-06-08

## 2022-10-12 PROBLEM — H40.051 OCULAR HYPERTENSION; RIGHT EYE: Status: ACTIVE | Noted: 2020-11-16

## 2022-10-12 PROCEDURE — 99024 POSTOP FOLLOW-UP VISIT: CPT | Performed by: OPHTHALMOLOGY

## 2022-10-12 PROCEDURE — NCRX N/C GLASSES RX: Performed by: OPHTHALMOLOGY

## 2022-10-12 ASSESSMENT — KERATOMETRY
OS_AXISANGLE_DEGREES: 167
OS_K2POWER_DIOPTERS: 46.25
OS_K1POWER_DIOPTERS: 45.50
OD_AXISANGLE_DEGREES: 042
OD_K1POWER_DIOPTERS: 45.25
OD_K2POWER_DIOPTERS: 46.00

## 2022-10-12 ASSESSMENT — REFRACTION_AUTOREFRACTION
OD_CYLINDER: -1.75
OD_AXIS: 098
OD_SPHERE: +2.25
OS_CYLINDER: -1.25
OS_SPHERE: +0.50
OS_AXIS: 065

## 2022-10-12 ASSESSMENT — REFRACTION_MANIFEST
OD_ADD: +2.50
OD_CYLINDER: -1.25
OD_VA1: 20/25+2
OD_AXIS: 105
OS_AXIS: 065
OS_CYLINDER: -1.00
OS_VA2: 20/60-2
OS_SPHERE: +0.50
OS_VA1: 20/60-2
OD_VA2: 20/25+2
OD_SPHERE: +1.50
OS_ADD: +0.75

## 2022-10-12 ASSESSMENT — REFRACTION_CURRENTRX
OD_OVR_VA: 20/
OS_OVR_VA: 20/

## 2022-10-12 ASSESSMENT — SPHEQUIV_DERIVED
OS_SPHEQUIV: -0.125
OD_SPHEQUIV: 1.375
OS_SPHEQUIV: 0
OD_SPHEQUIV: 0.875

## 2022-10-12 ASSESSMENT — VISUAL ACUITY
OS_BCVA: 20/25-1
OD_BCVA: 20/60-2

## 2022-10-12 ASSESSMENT — AXIALLENGTH_DERIVED
OD_AL: 22.3452
OS_AL: 22.796
OD_AL: 22.5214
OS_AL: 22.7507

## 2022-11-07 ENCOUNTER — RX ONLY (RX ONLY)
Age: 72
End: 2022-11-07

## 2022-11-07 ENCOUNTER — DOCTOR'S OFFICE (OUTPATIENT)
Dept: URBAN - NONMETROPOLITAN AREA CLINIC 1 | Facility: CLINIC | Age: 72
Setting detail: OPHTHALMOLOGY
End: 2022-11-07
Payer: COMMERCIAL

## 2022-11-07 DIAGNOSIS — H34.8322: ICD-10-CM

## 2022-11-07 DIAGNOSIS — H35.3132: ICD-10-CM

## 2022-11-07 PROCEDURE — 92250 FUNDUS PHOTOGRAPHY W/I&R: CPT | Performed by: OPHTHALMOLOGY

## 2022-11-07 PROCEDURE — 99213 OFFICE O/P EST LOW 20 MIN: CPT | Performed by: OPHTHALMOLOGY

## 2022-11-07 PROCEDURE — 92235 FLUORESCEIN ANGRPH MLTIFRAME: CPT | Performed by: OPHTHALMOLOGY

## 2022-11-07 ASSESSMENT — AXIALLENGTH_DERIVED
OD_AL: 22.3452
OS_AL: 22.796
OS_AL: 22.7507
OD_AL: 22.5214

## 2022-11-07 ASSESSMENT — REFRACTION_MANIFEST
OS_VA1: 20/60-2
OS_VA2: 20/60-2
OS_AXIS: 065
OS_SPHERE: +0.50
OD_VA2: 20/25+2
OD_VA1: 20/25+2
OD_CYLINDER: -1.25
OS_ADD: +0.75
OD_SPHERE: +1.50
OD_ADD: +2.50
OS_CYLINDER: -1.00
OD_AXIS: 105

## 2022-11-07 ASSESSMENT — REFRACTION_AUTOREFRACTION
OS_CYLINDER: -1.25
OD_AXIS: 098
OD_SPHERE: +2.25
OD_CYLINDER: -1.75
OS_SPHERE: +0.50
OS_AXIS: 065

## 2022-11-07 ASSESSMENT — KERATOMETRY
OD_K2POWER_DIOPTERS: 46.00
OS_K1POWER_DIOPTERS: 45.50
OS_AXISANGLE_DEGREES: 167
OS_K2POWER_DIOPTERS: 46.25
OD_AXISANGLE_DEGREES: 042
OD_K1POWER_DIOPTERS: 45.25

## 2022-11-07 ASSESSMENT — CONFRONTATIONAL VISUAL FIELD TEST (CVF)
OS_FINDINGS: FULL
OD_FINDINGS: FULL

## 2022-11-07 ASSESSMENT — SPHEQUIV_DERIVED
OS_SPHEQUIV: 0
OD_SPHEQUIV: 1.375
OD_SPHEQUIV: 0.875
OS_SPHEQUIV: -0.125

## 2022-11-07 ASSESSMENT — VISUAL ACUITY
OD_BCVA: 20/40-2
OS_BCVA: 20/25-2

## 2022-11-07 ASSESSMENT — REFRACTION_CURRENTRX
OD_OVR_VA: 20/
OS_OVR_VA: 20/

## 2022-11-15 ENCOUNTER — DOCTOR'S OFFICE (OUTPATIENT)
Dept: URBAN - NONMETROPOLITAN AREA CLINIC 1 | Facility: CLINIC | Age: 72
Setting detail: OPHTHALMOLOGY
End: 2022-11-15
Payer: COMMERCIAL

## 2022-11-15 DIAGNOSIS — H25.11: ICD-10-CM

## 2022-11-15 DIAGNOSIS — H35.3132: ICD-10-CM

## 2022-11-15 DIAGNOSIS — H04.121: ICD-10-CM

## 2022-11-15 DIAGNOSIS — H04.122: ICD-10-CM

## 2022-11-15 DIAGNOSIS — H34.8322: ICD-10-CM

## 2022-11-15 DIAGNOSIS — H26.40: ICD-10-CM

## 2022-11-15 DIAGNOSIS — H40.051: ICD-10-CM

## 2022-11-15 DIAGNOSIS — Z96.1: ICD-10-CM

## 2022-11-15 PROCEDURE — 99213 OFFICE O/P EST LOW 20 MIN: CPT | Performed by: OPHTHALMOLOGY

## 2022-11-15 ASSESSMENT — KERATOMETRY
OS_AXISANGLE_DEGREES: 073
OD_AXISANGLE_DEGREES: 010
OD_K1POWER_DIOPTERS: 45.50
OS_K2POWER_DIOPTERS: 46.25
OS_K1POWER_DIOPTERS: 44.75
OD_K2POWER_DIOPTERS: 46.00

## 2022-11-15 ASSESSMENT — AXIALLENGTH_DERIVED
OS_AL: 22.8795
OD_AL: 22.4796
OD_AL: 22.2174

## 2022-11-15 ASSESSMENT — REFRACTION_MANIFEST
OS_AXIS: 065
OD_CYLINDER: -1.25
OS_VA2: 20/60-2
OS_VA1: 20/60-2
OD_AXIS: 105
OS_CYLINDER: -1.00
OD_ADD: +2.50
OD_VA2: 20/25+2
OD_SPHERE: +1.50
OS_ADD: +0.75
OD_VA1: 20/25+2
OS_SPHERE: +0.50

## 2022-11-15 ASSESSMENT — SPHEQUIV_DERIVED
OS_SPHEQUIV: 0
OD_SPHEQUIV: 1.625
OD_SPHEQUIV: 0.875

## 2022-11-15 ASSESSMENT — VISUAL ACUITY
OD_BCVA: 20/30-1
OS_BCVA: 20/40-2

## 2022-11-15 ASSESSMENT — REFRACTION_AUTOREFRACTION
OD_AXIS: 103
OD_SPHERE: +2.25
OD_CYLINDER: -1.25
OS_SPHERE: ERROR

## 2022-11-15 ASSESSMENT — REFRACTION_CURRENTRX
OS_OVR_VA: 20/
OD_OVR_VA: 20/

## 2023-02-21 ENCOUNTER — DOCTOR'S OFFICE (OUTPATIENT)
Dept: URBAN - NONMETROPOLITAN AREA CLINIC 1 | Facility: CLINIC | Age: 73
Setting detail: OPHTHALMOLOGY
End: 2023-02-21
Payer: COMMERCIAL

## 2023-02-21 DIAGNOSIS — H25.11: ICD-10-CM

## 2023-02-21 DIAGNOSIS — Z96.1: ICD-10-CM

## 2023-02-21 DIAGNOSIS — H35.373: ICD-10-CM

## 2023-02-21 DIAGNOSIS — H26.40: ICD-10-CM

## 2023-02-21 PROBLEM — H43.813 POSTERIOR VITREOUS DETACHMENT; BOTH EYES: Status: ACTIVE | Noted: 2023-02-21

## 2023-02-21 PROCEDURE — 99214 OFFICE O/P EST MOD 30 MIN: CPT | Performed by: OPHTHALMOLOGY

## 2023-02-21 PROCEDURE — 92134 CPTRZ OPH DX IMG PST SGM RTA: CPT | Performed by: OPHTHALMOLOGY

## 2023-02-21 ASSESSMENT — REFRACTION_MANIFEST
OS_CYLINDER: -1.00
OS_VA2: 20/60-2
OD_ADD: +2.50
OS_AXIS: 065
OS_ADD: +0.75
OD_VA1: 20/25+2
OD_AXIS: 105
OS_VA1: 20/60-2
OD_VA2: 20/25+2
OD_SPHERE: +1.50
OD_CYLINDER: -1.25
OS_SPHERE: +0.50

## 2023-02-21 ASSESSMENT — KERATOMETRY
OS_K2POWER_DIOPTERS: 46.50
OD_AXISANGLE_DEGREES: 025
OS_AXISANGLE_DEGREES: 170
OD_K2POWER_DIOPTERS: 45.75
OD_K1POWER_DIOPTERS: 45.00
OS_K1POWER_DIOPTERS: 45.25

## 2023-02-21 ASSESSMENT — CONFRONTATIONAL VISUAL FIELD TEST (CVF)
OS_FINDINGS: FULL
OD_FINDINGS: FULL

## 2023-02-21 ASSESSMENT — REFRACTION_AUTOREFRACTION
OS_CYLINDER: -0.75
OD_AXIS: 094
OS_AXIS: 080
OD_SPHERE: +2.75
OS_SPHERE: +0.25
OD_CYLINDER: --1.75

## 2023-02-21 ASSESSMENT — VISUAL ACUITY
OS_BCVA: 20/30-1
OD_BCVA: 20/25-1

## 2023-02-21 ASSESSMENT — REFRACTION_CURRENTRX
OS_OVR_VA: 20/
OD_OVR_VA: 20/

## 2023-02-21 ASSESSMENT — AXIALLENGTH_DERIVED
OS_AL: 22.7507
OD_AL: 22.6054
OS_AL: 22.796

## 2023-02-21 ASSESSMENT — SPHEQUIV_DERIVED
OS_SPHEQUIV: -0.125
OS_SPHEQUIV: 0
OD_SPHEQUIV: 0.875

## 2023-03-29 ENCOUNTER — DOCTOR'S OFFICE (OUTPATIENT)
Dept: URBAN - NONMETROPOLITAN AREA CLINIC 1 | Facility: CLINIC | Age: 73
Setting detail: OPHTHALMOLOGY
End: 2023-03-29
Payer: COMMERCIAL

## 2023-03-29 DIAGNOSIS — H25.11: ICD-10-CM

## 2023-03-29 PROCEDURE — NO CHARGE N/C PROFESSIONAL COURTESY: Performed by: OPHTHALMOLOGY

## 2023-04-11 ENCOUNTER — AMBUL SURGICAL CARE (OUTPATIENT)
Dept: URBAN - NONMETROPOLITAN AREA SURGERY 1 | Facility: SURGERY | Age: 73
Setting detail: OPHTHALMOLOGY
End: 2023-04-11
Payer: COMMERCIAL

## 2023-04-11 DIAGNOSIS — H25.041: ICD-10-CM

## 2023-04-11 DIAGNOSIS — H25.11: ICD-10-CM

## 2023-04-11 DIAGNOSIS — H25.011: ICD-10-CM

## 2023-04-11 PROCEDURE — 66984 XCAPSL CTRC RMVL W/O ECP: CPT | Performed by: OPHTHALMOLOGY

## 2023-04-11 PROCEDURE — G8918 PT W/O PREOP ORDER IV AB PRO: HCPCS | Performed by: OPHTHALMOLOGY

## 2023-04-11 PROCEDURE — V2788 PRESBYOPIA-CORRECT FUNCTION: HCPCS | Performed by: OPHTHALMOLOGY

## 2023-04-11 PROCEDURE — G8907 PT DOC NO EVENTS ON DISCHARG: HCPCS | Performed by: OPHTHALMOLOGY

## 2023-04-11 PROCEDURE — G8907 PT DOC NO EVENTS ON DISCHARG: HCPCS | Performed by: CLINIC/CENTER

## 2023-04-11 PROCEDURE — 66984 XCAPSL CTRC RMVL W/O ECP: CPT | Performed by: CLINIC/CENTER

## 2023-04-11 PROCEDURE — G8918 PT W/O PREOP ORDER IV AB PRO: HCPCS | Performed by: CLINIC/CENTER

## 2023-04-12 ENCOUNTER — DOCTOR'S OFFICE (OUTPATIENT)
Dept: URBAN - NONMETROPOLITAN AREA CLINIC 1 | Facility: CLINIC | Age: 73
Setting detail: OPHTHALMOLOGY
End: 2023-04-12
Payer: COMMERCIAL

## 2023-04-12 ENCOUNTER — RX ONLY (RX ONLY)
Age: 73
End: 2023-04-12

## 2023-04-12 DIAGNOSIS — Z96.1: ICD-10-CM

## 2023-04-12 PROCEDURE — 99024 POSTOP FOLLOW-UP VISIT: CPT | Performed by: OPHTHALMOLOGY

## 2023-04-12 ASSESSMENT — AXIALLENGTH_DERIVED
OS_AL: 22.8416
OD_AL: 22.7325
OD_AL: 23.1939
OS_AL: 22.7507

## 2023-04-12 ASSESSMENT — REFRACTION_CURRENTRX
OS_OVR_VA: 20/
OD_OVR_VA: 20/

## 2023-04-12 ASSESSMENT — KERATOMETRY
OS_K1POWER_DIOPTERS: 45.50
OD_K2POWER_DIOPTERS: 45.50
OS_AXISANGLE_DEGREES: 142
OD_AXISANGLE_DEGREES: 033
OS_K2POWER_DIOPTERS: 46.25
OD_K1POWER_DIOPTERS: 44.50

## 2023-04-12 ASSESSMENT — VISUAL ACUITY
OD_BCVA: 20/60
OS_BCVA: 20/40

## 2023-04-12 ASSESSMENT — REFRACTION_MANIFEST
OD_ADD: +2.50
OD_SPHERE: +1.50
OS_CYLINDER: -1.00
OS_AXIS: 065
OS_VA1: 20/60-2
OS_VA2: 20/60-2
OD_VA2: 20/25+2
OS_ADD: +0.75
OS_SPHERE: +0.50
OD_VA1: 20/25+2
OD_AXIS: 105
OD_CYLINDER: -1.25

## 2023-04-12 ASSESSMENT — DRY EYES - PHYSICIAN NOTES: OD_GENERALCOMMENTS: 1+SPK

## 2023-04-12 ASSESSMENT — REFRACTION_AUTOREFRACTION
OS_AXIS: 042
OD_SPHERE: +0.25
OD_AXIS: 134
OS_CYLINDER: -1.00
OD_CYLINDER: -1.25
OS_SPHERE: +0.25

## 2023-04-12 ASSESSMENT — SPHEQUIV_DERIVED
OD_SPHEQUIV: 0.875
OS_SPHEQUIV: -0.25
OS_SPHEQUIV: 0
OD_SPHEQUIV: -0.375

## 2023-04-17 PROBLEM — R97.20 ELEVATED PSA: Status: ACTIVE | Noted: 2023-04-17

## 2023-04-18 PROBLEM — N18.32 CHRONIC KIDNEY DISEASE, STAGE 3B (HCC): Status: ACTIVE | Noted: 2023-04-18

## 2023-04-21 ENCOUNTER — DOCTOR'S OFFICE (OUTPATIENT)
Dept: URBAN - NONMETROPOLITAN AREA CLINIC 1 | Facility: CLINIC | Age: 73
Setting detail: OPHTHALMOLOGY
End: 2023-04-21
Payer: COMMERCIAL

## 2023-04-21 DIAGNOSIS — Z96.1: ICD-10-CM

## 2023-04-21 PROCEDURE — 99024 POSTOP FOLLOW-UP VISIT: CPT | Performed by: OPHTHALMOLOGY

## 2023-04-21 ASSESSMENT — PACHYMETRY
OD_CT_CORRECTION: -1
OS_CT_UM: 553
OD_CT_UM: 557
OS_CT_CORRECTION: -1

## 2023-04-21 ASSESSMENT — REFRACTION_AUTOREFRACTION
OD_AXIS: 121
OS_SPHERE: ERROR
OD_CYLINDER: -0.75
OD_SPHERE: 0.00

## 2023-04-21 ASSESSMENT — DRY EYES - PHYSICIAN NOTES: OD_GENERALCOMMENTS: 1+ SPK

## 2023-04-21 ASSESSMENT — SPHEQUIV_DERIVED
OD_SPHEQUIV: 0.875
OD_SPHEQUIV: -0.375
OS_SPHEQUIV: 0

## 2023-04-21 ASSESSMENT — REFRACTION_MANIFEST
OD_AXIS: 105
OD_VA2: 20/25+2
OS_SPHERE: +0.50
OD_ADD: +2.50
OS_VA1: 20/60-2
OS_VA2: 20/60-2
OS_AXIS: 065
OD_CYLINDER: -1.25
OD_VA1: 20/25+2
OS_ADD: +0.75
OS_CYLINDER: -1.00
OD_SPHERE: +1.50

## 2023-04-21 ASSESSMENT — TONOMETRY
OS_IOP_MMHG: 17
OD_IOP_MMHG: 14

## 2023-04-21 ASSESSMENT — VISUAL ACUITY
OS_BCVA: 20/20-1
OD_BCVA: 20/25+2

## 2023-04-21 ASSESSMENT — AXIALLENGTH_DERIVED
OD_AL: 23.0615
OS_AL: 22.7507
OD_AL: 22.6054

## 2023-04-21 ASSESSMENT — REFRACTION_CURRENTRX
OS_OVR_VA: 20/
OD_OVR_VA: 20/

## 2023-04-21 ASSESSMENT — KERATOMETRY
OS_K1POWER_DIOPTERS: 45.50
OS_AXISANGLE_DEGREES: 164
OD_AXISANGLE_DEGREES: 026
OD_K2POWER_DIOPTERS: 46.00
OS_K2POWER_DIOPTERS: 46.25
OD_K1POWER_DIOPTERS: 44.75

## 2023-05-18 ENCOUNTER — HOSPITAL ENCOUNTER (OUTPATIENT)
Dept: MRI IMAGING | Facility: HOSPITAL | Age: 73
End: 2023-05-18
Attending: UROLOGY

## 2023-05-18 DIAGNOSIS — R97.20 ELEVATED PSA: ICD-10-CM

## 2023-05-18 RX ADMIN — GADOBUTROL 10 ML: 604.72 INJECTION INTRAVENOUS at 12:35

## 2023-06-02 ENCOUNTER — DOCTOR'S OFFICE (OUTPATIENT)
Dept: URBAN - NONMETROPOLITAN AREA CLINIC 1 | Facility: CLINIC | Age: 73
Setting detail: OPHTHALMOLOGY
End: 2023-06-02
Payer: COMMERCIAL

## 2023-06-02 DIAGNOSIS — Z96.1: ICD-10-CM

## 2023-06-02 PROCEDURE — NO CHARGE N/C PROFESSIONAL COURTESY: Performed by: OPHTHALMOLOGY

## 2023-06-02 ASSESSMENT — KERATOMETRY
OD_K1POWER_DIOPTERS: 44.75
OD_AXISANGLE_DEGREES: 026
OD_K2POWER_DIOPTERS: 46.00
OS_K1POWER_DIOPTERS: 45.50
OS_AXISANGLE_DEGREES: 164
OS_K2POWER_DIOPTERS: 46.25

## 2023-06-02 ASSESSMENT — REFRACTION_MANIFEST
OS_CYLINDER: -1.00
OS_SPHERE: +0.50
OS_VA1: 20/60-2
OS_AXIS: 065
OS_ADD: +0.75
OD_AXIS: 105
OD_VA2: 20/25+2
OD_ADD: +2.50
OD_VA1: 20/25+2
OD_SPHERE: +1.50
OS_VA2: 20/60-2
OD_CYLINDER: -1.25

## 2023-06-02 ASSESSMENT — AXIALLENGTH_DERIVED
OD_AL: 22.6054
OD_AL: 23.0615
OS_AL: 22.7507

## 2023-06-02 ASSESSMENT — REFRACTION_CURRENTRX
OS_OVR_VA: 20/
OD_OVR_VA: 20/

## 2023-06-02 ASSESSMENT — SPHEQUIV_DERIVED
OD_SPHEQUIV: -0.375
OS_SPHEQUIV: 0
OD_SPHEQUIV: 0.875

## 2023-06-02 ASSESSMENT — REFRACTION_AUTOREFRACTION
OD_SPHERE: 0.00
OD_CYLINDER: -0.75
OD_AXIS: 121
OS_SPHERE: ERROR

## 2023-06-02 ASSESSMENT — VISUAL ACUITY
OS_BCVA: 20/20-1
OD_BCVA: 20/25+2

## 2023-06-06 ENCOUNTER — RX ONLY (RX ONLY)
Age: 73
End: 2023-06-06

## 2023-06-06 ENCOUNTER — DOCTOR'S OFFICE (OUTPATIENT)
Dept: URBAN - NONMETROPOLITAN AREA CLINIC 1 | Facility: CLINIC | Age: 73
Setting detail: OPHTHALMOLOGY
End: 2023-06-06
Payer: COMMERCIAL

## 2023-06-06 DIAGNOSIS — H26.40: ICD-10-CM

## 2023-06-06 DIAGNOSIS — Z96.1: ICD-10-CM

## 2023-06-06 DIAGNOSIS — H04.121: ICD-10-CM

## 2023-06-06 DIAGNOSIS — H04.122: ICD-10-CM

## 2023-06-06 PROCEDURE — 99024 POSTOP FOLLOW-UP VISIT: CPT | Performed by: OPHTHALMOLOGY

## 2023-06-06 ASSESSMENT — REFRACTION_MANIFEST
OS_SPHERE: +0.50
OD_SPHERE: +1.50
OD_ADD: +2.50
OS_CYLINDER: -1.00
OS_AXIS: 065
OD_VA1: 20/25+2
OS_VA1: 20/60-2
OD_VA2: 20/25+2
OS_ADD: +0.75
OS_VA2: 20/60-2
OD_AXIS: 105
OD_CYLINDER: -1.25

## 2023-06-06 ASSESSMENT — REFRACTION_AUTOREFRACTION
OS_AXIS: 056
OD_SPHERE: +0.25
OD_AXIS: 180
OS_CYLINDER: -0.75
OD_CYLINDER: 0.00
OS_SPHERE: +0.50

## 2023-06-06 ASSESSMENT — KERATOMETRY
OD_K1POWER_DIOPTERS: 45.50
OS_K1POWER_DIOPTERS: 45.75
OS_AXISANGLE_DEGREES: 159
OD_AXISANGLE_DEGREES: 049
OD_K2POWER_DIOPTERS: 46.00
OS_K2POWER_DIOPTERS: 46.50

## 2023-06-06 ASSESSMENT — VISUAL ACUITY
OD_BCVA: 20/25-2
OS_BCVA: 20/25-2

## 2023-06-06 ASSESSMENT — REFRACTION_CURRENTRX
OS_OVR_VA: 20/
OD_OVR_VA: 20/

## 2023-06-06 ASSESSMENT — DRY EYES - PHYSICIAN NOTES
OD_GENERALCOMMENTS: TRACE PEE
OS_GENERALCOMMENTS: TRACE PEE

## 2023-06-06 ASSESSMENT — AXIALLENGTH_DERIVED
OS_AL: 22.6207
OS_AL: 22.6656
OD_AL: 22.7029
OD_AL: 22.4796

## 2023-06-06 ASSESSMENT — CONFRONTATIONAL VISUAL FIELD TEST (CVF)
OD_FINDINGS: FULL
OS_FINDINGS: FULL

## 2023-06-06 ASSESSMENT — SPHEQUIV_DERIVED
OD_SPHEQUIV: 0.25
OS_SPHEQUIV: 0.125
OD_SPHEQUIV: 0.875
OS_SPHEQUIV: 0

## 2023-06-07 ENCOUNTER — OFFICE VISIT (OUTPATIENT)
Dept: UROLOGY | Facility: CLINIC | Age: 73
End: 2023-06-07
Payer: MEDICARE

## 2023-06-07 VITALS
HEIGHT: 68 IN | DIASTOLIC BLOOD PRESSURE: 70 MMHG | WEIGHT: 214 LBS | BODY MASS INDEX: 32.43 KG/M2 | HEART RATE: 82 BPM | OXYGEN SATURATION: 98 % | SYSTOLIC BLOOD PRESSURE: 138 MMHG | TEMPERATURE: 97.2 F

## 2023-06-07 DIAGNOSIS — R97.20 ELEVATED PSA: Primary | ICD-10-CM

## 2023-06-07 PROCEDURE — 99213 OFFICE O/P EST LOW 20 MIN: CPT | Performed by: UROLOGY

## 2023-06-07 RX ORDER — KETOCONAZOLE 20 MG/G
CREAM TOPICAL
COMMUNITY
Start: 2023-05-23

## 2023-06-07 RX ORDER — OFLOXACIN 3 MG/ML
SOLUTION/ DROPS OPHTHALMIC
COMMUNITY
Start: 2023-05-31

## 2023-06-07 NOTE — PROGRESS NOTES
UROLOGY PROGRESS NOTE         NAME: Gin Santiago  AGE: 68 y o  SEX: male  : 1950   MRN: [id number]    DATE: 2023  TIME: 3:38 PM    Assessment and Plan      Impression:   1  Elevated PSA  -     PSA Total, Diagnostic; Future; Expected date: 2023         Plan: Patient with history of elevated PSA  Prior biopsies benign  Recent parametric MRI of prostate RADS 2 low risk of prostate cancer being present  Discussed options  We will continue surveillance with PSA every 6 months  Follow-up 6 months      Chief Complaint   No chief complaint on file  History of Present Illness     HPI: Gin Santiago is a 68y o  year old male who presents with history of an elevated PSA  His PSA had gone up a bit  We got an MRI of the prostate  This is read as RADS 2 low risk of prostate cancer being present  He is here for follow-up    Prior prostate biopsies benign              The following portions of the patient's history were reviewed and updated as appropriate: allergies, current medications, past family history, past medical history, past social history, past surgical history and problem list   Past Medical History:   Diagnosis Date   • Abdominal aortic aneurysm (AAA) ([facility] [address] )    • Figueredo's esophagus    • Benign paroxysmal vertigo 2004   • Benign prostatic hyperplasia    • Cataract of both eyes    • Chronic kidney disease     CKD   • Elevated PSA    • Family history of kidney cancer    • Fatty liver    • GERD (gastroesophageal reflux disease)    • Hematuria 2005   • History of CVA (cerebrovascular accident)    • Hx of colonic polyp    • Hx of transient ischemic attack (TIA)    • Hyperlipidemia    • Hypertension     In past   • Macular degeneration of both eyes    • Malaise and fatigue 2004   • Migraine variant, intractable    • Panic disorder 2002   • Persistent insomnia 2003   • Prostatitis    • Rosacea    • Subjective tinnitus    • Testicular hypofunction    • "Testosterone deficiency    • Tobacco use disorder    • Varicocele      Past Surgical History:   Procedure Laterality Date   • CYSTOSCOPY       shoulder  Review of Systems     Const: Denies chills, fever and weight loss  CV: Denies chest pain  Resp: Denies SOB  GI: Denies abdominal pain, nausea and vomiting  : Denies symptoms other than stated above  Musculo: Denies back pain  Objective   /70 (BP Location: Left arm, Patient Position: Sitting, Cuff Size: Standard)   Pulse 82   Temp (!) 97 2 °F (36 2 °C)   Ht 5' 8\" (1 727 m)   Wt 97 1 kg (214 lb)   SpO2 98%   BMI 32 54 kg/m²     Physical Exam  Const: Appears healthy and well developed  No signs of acute distress present  Resp: Respirations are regular and unlabored  CV: Rate is regular  Rhythm is regular  Abdomen: Abdomen is soft, nontender, and nondistended  Kidneys are not palpable  : Not performed  Psych: Patient's attitude is cooperative   Mood is normal  Affect is normal     Procedure   Procedures     Current Medications     Current Outpatient Medications:   •  acetaminophen (TYLENOL) 500 mg tablet, Take 500 mg by mouth every 6 (six) hours as needed, Disp: , Rfl:   •  aspirin (ECOTRIN LOW STRENGTH) 81 mg EC tablet, 81 mg, Disp: , Rfl:   •  ezetimibe (ZETIA) 10 mg tablet, Take 1 tablet by mouth daily, Disp: , Rfl:   •  ketoconazole (NIZORAL) 2 % cream, MIX WITH CORTISONE, APPLY TO AREA ON UPPER LIP 2-3 TIMES A WEEK AS NEEDED FOR FLARES, Disp: , Rfl:   •  Omega-3 Fatty Acids (fish oil) 1,000 mg, Take 1,000 mg by mouth daily, Disp: , Rfl:   •  sucralfate (CARAFATE) 1 g tablet, Take 1 g by mouth Three times daily as needed, Disp: , Rfl:   •  ofloxacin (OCUFLOX) 0 3 % ophthalmic solution, INSTILL 1 DROP IN BOTH EARS TWICE A DAY FOR 5 DAYS FOR CHRONIC EAR INFECTION (EYE DROP OKAY FOR EAR) (Patient not taking: Reported on 6/7/2023), Disp: , Rfl:         Adam Moss MD        "

## 2023-06-09 ENCOUNTER — DOCTOR'S OFFICE (OUTPATIENT)
Dept: URBAN - NONMETROPOLITAN AREA CLINIC 1 | Facility: CLINIC | Age: 73
Setting detail: OPHTHALMOLOGY
End: 2023-06-09
Payer: COMMERCIAL

## 2023-06-09 DIAGNOSIS — H26.40: ICD-10-CM

## 2023-06-09 DIAGNOSIS — H26.492: ICD-10-CM

## 2023-06-09 PROCEDURE — 99024 POSTOP FOLLOW-UP VISIT: CPT | Performed by: OPHTHALMOLOGY

## 2023-06-09 PROCEDURE — 66821 AFTER CATARACT LASER SURGERY: CPT | Performed by: OPHTHALMOLOGY

## 2023-06-09 ASSESSMENT — REFRACTION_MANIFEST
OS_SPHERE: +0.50
OS_VA1: 20/60-2
OD_CYLINDER: -1.25
OD_SPHERE: +1.50
OS_AXIS: 065
OD_AXIS: 105
OD_VA2: 20/25+2
OD_VA1: 20/25+2
OS_CYLINDER: -1.00
OS_VA2: 20/60-2
OD_ADD: +2.50
OS_ADD: +0.75

## 2023-06-09 ASSESSMENT — KERATOMETRY
OS_AXISANGLE_DEGREES: 159
OS_K1POWER_DIOPTERS: 45.75
OD_AXISANGLE_DEGREES: 049
OS_K2POWER_DIOPTERS: 46.50
OD_K1POWER_DIOPTERS: 45.50
OD_K2POWER_DIOPTERS: 46.00

## 2023-06-09 ASSESSMENT — VISUAL ACUITY
OD_BCVA: 20/25-2
OS_BCVA: 20/25-1

## 2023-06-09 ASSESSMENT — REFRACTION_AUTOREFRACTION
OD_SPHERE: +0.25
OS_SPHERE: +0.50
OS_CYLINDER: -0.75
OS_AXIS: 056
OD_CYLINDER: 0.00
OD_AXIS: 180

## 2023-06-09 ASSESSMENT — AXIALLENGTH_DERIVED
OD_AL: 22.4796
OS_AL: 22.6207
OS_AL: 22.6656
OD_AL: 22.7029

## 2023-06-09 ASSESSMENT — REFRACTION_CURRENTRX
OD_OVR_VA: 20/
OS_OVR_VA: 20/

## 2023-06-09 ASSESSMENT — SPHEQUIV_DERIVED
OD_SPHEQUIV: 0.875
OD_SPHEQUIV: 0.25
OS_SPHEQUIV: 0
OS_SPHEQUIV: 0.125

## 2023-06-23 ENCOUNTER — AMBUL SURGICAL CARE (OUTPATIENT)
Dept: URBAN - NONMETROPOLITAN AREA SURGERY 1 | Facility: SURGERY | Age: 73
Setting detail: OPHTHALMOLOGY
End: 2023-06-23
Payer: COMMERCIAL

## 2023-06-23 DIAGNOSIS — H26.491: ICD-10-CM

## 2023-06-23 PROCEDURE — 66821 AFTER CATARACT LASER SURGERY: CPT | Performed by: CLINIC/CENTER

## 2023-06-23 PROCEDURE — G8907 PT DOC NO EVENTS ON DISCHARG: HCPCS | Performed by: OPHTHALMOLOGY

## 2023-06-23 PROCEDURE — 66821 AFTER CATARACT LASER SURGERY: CPT | Performed by: OPHTHALMOLOGY

## 2023-06-23 PROCEDURE — G8918 PT W/O PREOP ORDER IV AB PRO: HCPCS | Performed by: CLINIC/CENTER

## 2023-06-23 PROCEDURE — G8918 PT W/O PREOP ORDER IV AB PRO: HCPCS | Performed by: OPHTHALMOLOGY

## 2023-06-23 PROCEDURE — G8907 PT DOC NO EVENTS ON DISCHARG: HCPCS | Performed by: CLINIC/CENTER

## 2023-07-12 ENCOUNTER — DOCTOR'S OFFICE (OUTPATIENT)
Dept: URBAN - NONMETROPOLITAN AREA CLINIC 1 | Facility: CLINIC | Age: 73
Setting detail: OPHTHALMOLOGY
End: 2023-07-12
Payer: COMMERCIAL

## 2023-07-12 DIAGNOSIS — H04.122: ICD-10-CM

## 2023-07-12 DIAGNOSIS — H35.373: ICD-10-CM

## 2023-07-12 DIAGNOSIS — H43.813: ICD-10-CM

## 2023-07-12 DIAGNOSIS — H04.121: ICD-10-CM

## 2023-07-12 DIAGNOSIS — Z96.1: ICD-10-CM

## 2023-07-12 DIAGNOSIS — H34.8322: ICD-10-CM

## 2023-07-12 DIAGNOSIS — H40.051: ICD-10-CM

## 2023-07-12 DIAGNOSIS — H35.3132: ICD-10-CM

## 2023-07-12 DIAGNOSIS — H26.40: ICD-10-CM

## 2023-07-12 PROCEDURE — 99024 POSTOP FOLLOW-UP VISIT: CPT | Performed by: OPHTHALMOLOGY

## 2023-07-12 PROCEDURE — 92134 CPTRZ OPH DX IMG PST SGM RTA: CPT | Performed by: OPHTHALMOLOGY

## 2023-07-12 ASSESSMENT — AXIALLENGTH_DERIVED
OS_AL: 22.9359
OD_AL: 22.5633
OD_AL: 23.02
OS_AL: 22.708

## 2023-07-12 ASSESSMENT — REFRACTION_MANIFEST
OD_ADD: +2.50
OD_CYLINDER: -1.25
OD_VA1: 20/25+2
OS_ADD: +0.75
OS_CYLINDER: -1.00
OS_SPHERE: +0.50
OD_SPHERE: +1.50
OS_VA2: 20/60-2
OD_AXIS: 105
OD_VA2: 20/25+2
OS_AXIS: 065
OS_VA1: 20/60-2

## 2023-07-12 ASSESSMENT — DRY EYES - PHYSICIAN NOTES
OS_GENERALCOMMENTS: TRACE PEE
OD_GENERALCOMMENTS: TRACE PEE

## 2023-07-12 ASSESSMENT — KERATOMETRY
OS_K2POWER_DIOPTERS: 46.25
OD_AXISANGLE_DEGREES: 023
OS_AXISANGLE_DEGREES: 158
OD_K2POWER_DIOPTERS: 46.00
OS_K1POWER_DIOPTERS: 45.75
OD_K1POWER_DIOPTERS: 45.00

## 2023-07-12 ASSESSMENT — REFRACTION_AUTOREFRACTION
OD_AXIS: 094
OS_SPHERE: -0.25
OD_CYLINDER: -1.00
OS_CYLINDER: -0.75
OS_AXIS: 056
OD_SPHERE: +0.12

## 2023-07-12 ASSESSMENT — SPHEQUIV_DERIVED
OD_SPHEQUIV: -0.38
OS_SPHEQUIV: -0.625
OD_SPHEQUIV: 0.875
OS_SPHEQUIV: 0

## 2023-07-12 ASSESSMENT — CONFRONTATIONAL VISUAL FIELD TEST (CVF)
OD_FINDINGS: FULL
OS_FINDINGS: FULL

## 2023-07-12 ASSESSMENT — REFRACTION_CURRENTRX
OD_OVR_VA: 20/
OS_OVR_VA: 20/

## 2023-07-12 ASSESSMENT — VISUAL ACUITY
OS_BCVA: 20/30-1
OD_BCVA: 20/25-1

## 2023-12-05 ENCOUNTER — TELEPHONE (OUTPATIENT)
Dept: UROLOGY | Facility: CLINIC | Age: 73
End: 2023-12-05

## 2023-12-05 RX ORDER — PANTOPRAZOLE SODIUM 20 MG/1
20 TABLET, DELAYED RELEASE ORAL DAILY
COMMUNITY

## 2023-12-05 RX ORDER — LORAZEPAM 1 MG/1
1 TABLET ORAL EVERY 8 HOURS PRN
COMMUNITY

## 2023-12-05 RX ORDER — ESOMEPRAZOLE MAGNESIUM 40 MG/1
40 CAPSULE, DELAYED RELEASE ORAL DAILY
COMMUNITY
Start: 2023-09-26

## 2023-12-05 RX ORDER — UBIDECARENONE 100 MG
1000 CAPSULE ORAL DAILY
COMMUNITY

## 2023-12-05 RX ORDER — ATORVASTATIN CALCIUM 20 MG/1
20 TABLET, FILM COATED ORAL DAILY
COMMUNITY

## 2023-12-06 ENCOUNTER — LAB (OUTPATIENT)
Dept: LAB | Facility: CLINIC | Age: 73
End: 2023-12-06
Payer: MEDICARE

## 2023-12-06 DIAGNOSIS — R97.20 ELEVATED PSA: ICD-10-CM

## 2023-12-06 LAB — PSA SERPL-MCNC: 4.75 NG/ML (ref 0–4)

## 2023-12-06 PROCEDURE — 36415 COLL VENOUS BLD VENIPUNCTURE: CPT

## 2023-12-06 PROCEDURE — 84153 ASSAY OF PSA TOTAL: CPT

## 2023-12-08 ENCOUNTER — OFFICE VISIT (OUTPATIENT)
Dept: UROLOGY | Facility: CLINIC | Age: 73
End: 2023-12-08
Payer: MEDICARE

## 2023-12-08 ENCOUNTER — TELEPHONE (OUTPATIENT)
Dept: UROLOGY | Facility: CLINIC | Age: 73
End: 2023-12-08

## 2023-12-08 VITALS
RESPIRATION RATE: 18 BRPM | HEIGHT: 68 IN | TEMPERATURE: 97.4 F | OXYGEN SATURATION: 99 % | DIASTOLIC BLOOD PRESSURE: 78 MMHG | SYSTOLIC BLOOD PRESSURE: 132 MMHG | HEART RATE: 68 BPM | BODY MASS INDEX: 33.04 KG/M2 | WEIGHT: 218 LBS

## 2023-12-08 DIAGNOSIS — R97.20 ELEVATED PSA: Primary | ICD-10-CM

## 2023-12-08 PROCEDURE — 99213 OFFICE O/P EST LOW 20 MIN: CPT | Performed by: UROLOGY

## 2023-12-08 RX ORDER — SILDENAFIL 50 MG/1
100 TABLET, FILM COATED ORAL DAILY PRN
Qty: 18 TABLET | Refills: 3 | Status: SHIPPED | OUTPATIENT
Start: 2023-12-08

## 2023-12-08 NOTE — TELEPHONE ENCOUNTER
Patient's wife returning missed call    She stated her  is currently in the office with Dr Lesley Porras for an appt.

## 2023-12-08 NOTE — TELEPHONE ENCOUNTER
----- Message from Taina Archibald MD sent at 12/8/2023  1:26 PM EST -----  Notify patient PSA dropped to 4.75. Please repeat in 6 months.   Please pend    Thanks

## 2023-12-08 NOTE — PROGRESS NOTES
UROLOGY PROGRESS NOTE         NAME: Phillip Schroeder  AGE: 68 y.o. SEX: male  : 1950   MRN: [id number]    DATE: 2023  TIME: 2:17 PM    Assessment and Plan      Impression:   1. Elevated PSA  -     PSA Total, Diagnostic; Future  -     sildenafil (VIAGRA) 50 MG tablet; Take 2 tablets (100 mg total) by mouth daily as needed for erectile dysfunction         Plan: PSA in 6 months. PSA dropped nicely to 4.75 recently. Will see him back in 1 year. Can see him sooner if issues. He also describes some mild issues with ED. We discussed options and I gave him a handheld prescription for sildenafil to trial.  We discussed the use of the medication along with its risks and interactions. He is not on nitroglycerin medication. Chief Complaint     Chief Complaint   Patient presents with   • Follow-up     6 month return visit, and PSA done on 23 was 4.75     History of Present Illness     HPI: Phillip Schroeder is a 68y.o. year old male who presents with a history of an elevated PSA and mild BPH.   Recent PSA dropped nicely to 4.75              The following portions of the patient's history were reviewed and updated as appropriate: allergies, current medications, past family history, past medical history, past social history, past surgical history and problem list.  Past Medical History:   Diagnosis Date   • Abdominal aortic aneurysm (AAA) ([address])    • Figueredo's esophagus    • Benign paroxysmal vertigo 2004   • Benign prostatic hyperplasia    • Cataract of both eyes    • Chronic kidney disease     CKD   • Elevated PSA    • Family history of kidney cancer    • Fatty liver    • GERD (gastroesophageal reflux disease)    • Hematuria 2005   • History of CVA (cerebrovascular accident)    • Hx of colonic polyp    • Hx of transient ischemic attack (TIA)    • Hyperlipidemia    • Hypertension     In past   • Macular degeneration of both eyes    • Malaise and fatigue 2004   • Migraine variant, intractable    • Panic disorder 08/30/2002   • Persistent insomnia 01/03/2003   • Prostatitis    • Rosacea    • Subjective tinnitus    • Testicular hypofunction    • Testosterone deficiency    • Tobacco use disorder    • Varicocele      Past Surgical History:   Procedure Laterality Date   • CYSTOSCOPY       shoulder  Review of Systems     Const: Denies chills, fever and weight loss. CV: Denies chest pain. Resp: Denies SOB. GI: Denies abdominal pain, nausea and vomiting. : Denies symptoms other than stated above. Musculo: Denies back pain. Objective   /78   Pulse 68   Temp (!) 97.4 °F (36.3 °C) (Tympanic)   Resp 18   Ht 5' 8" (1.727 m)   Wt 98.9 kg (218 lb)   SpO2 99%   BMI 33.15 kg/m²     Physical Exam  Const: Appears healthy and well developed. No signs of acute distress present. Resp: Respirations are regular and unlabored. CV: Rate is regular. Rhythm is regular. Abdomen: Abdomen is soft, nontender, and nondistended. Kidneys are not palpable. : Not performed  Psych: Patient's attitude is cooperative.  Mood is normal. Affect is normal.    Procedure   Procedures     Current Medications     Current Outpatient Medications:   •  acetaminophen (TYLENOL) 500 mg tablet, Take 500 mg by mouth every 6 (six) hours as needed, Disp: , Rfl:   •  aspirin (ECOTRIN LOW STRENGTH) 81 mg EC tablet, 81 mg, Disp: , Rfl:   •  atorvastatin (LIPITOR) 20 mg tablet, Take 20 mg by mouth daily, Disp: , Rfl:   •  Cholecalciferol (D3-1000) 25 MCG (1000 UT) capsule, Take 1,000 Units by mouth daily, Disp: , Rfl:   •  esomeprazole (NexIUM) 40 MG capsule, Take 40 mg by mouth in the morning, Disp: , Rfl:   •  ezetimibe (ZETIA) 10 mg tablet, Take 1 tablet by mouth daily, Disp: , Rfl:   •  ketoconazole (NIZORAL) 2 % cream, MIX WITH CORTISONE, APPLY TO AREA ON UPPER LIP 2-3 TIMES A WEEK AS NEEDED FOR FLARES, Disp: , Rfl:   •  LORazepam (Ativan) 1 mg tablet, Take 1 mg by mouth every 8 (eight) hours as needed, Disp: , Rfl:   •  Omega-3 Fatty Acids (fish oil) 1,000 mg, Take 1,000 mg by mouth daily, Disp: , Rfl:   •  pantoprazole (PROTONIX) 20 mg tablet, Take 20 mg by mouth daily, Disp: , Rfl:   •  sildenafil (VIAGRA) 50 MG tablet, Take 2 tablets (100 mg total) by mouth daily as needed for erectile dysfunction, Disp: 18 tablet, Rfl: 3  •  sucralfate (CARAFATE) 1 g tablet, Take 1 g by mouth Three times daily as needed, Disp: , Rfl:   •  ofloxacin (OCUFLOX) 0.3 % ophthalmic solution, INSTILL 1 DROP IN BOTH EARS TWICE A DAY FOR 5 DAYS FOR CHRONIC EAR INFECTION (EYE DROP OKAY FOR EAR) (Patient not taking: Reported on 6/7/2023), Disp: , Rfl:         Annei Wang MD

## 2024-01-15 ENCOUNTER — DOCTOR'S OFFICE (OUTPATIENT)
Dept: URBAN - NONMETROPOLITAN AREA CLINIC 1 | Facility: CLINIC | Age: 74
Setting detail: OPHTHALMOLOGY
End: 2024-01-15
Payer: COMMERCIAL

## 2024-01-15 DIAGNOSIS — Z96.1: ICD-10-CM

## 2024-01-15 DIAGNOSIS — H35.373: ICD-10-CM

## 2024-01-15 DIAGNOSIS — H40.051: ICD-10-CM

## 2024-01-15 DIAGNOSIS — H04.123: ICD-10-CM

## 2024-01-15 DIAGNOSIS — H34.8322: ICD-10-CM

## 2024-01-15 DIAGNOSIS — H43.813: ICD-10-CM

## 2024-01-15 DIAGNOSIS — H35.3132: ICD-10-CM

## 2024-01-15 DIAGNOSIS — H53.19: ICD-10-CM

## 2024-01-15 PROCEDURE — 92134 CPTRZ OPH DX IMG PST SGM RTA: CPT | Performed by: OPHTHALMOLOGY

## 2024-01-15 PROCEDURE — 99213 OFFICE O/P EST LOW 20 MIN: CPT | Performed by: OPHTHALMOLOGY

## 2024-01-15 ASSESSMENT — REFRACTION_MANIFEST
OS_SPHERE: +0.50
OS_VA2: 20/60-2
OD_SPHERE: +1.50
OS_VA1: 20/60-2
OD_ADD: +2.50
OD_VA2: 20/25+2
OS_AXIS: 065
OS_CYLINDER: -1.00
OD_CYLINDER: -1.25
OS_ADD: +0.75
OD_AXIS: 105
OD_VA1: 20/25+2

## 2024-01-15 ASSESSMENT — SPHEQUIV_DERIVED
OS_SPHEQUIV: 0
OD_SPHEQUIV: -0.38
OS_SPHEQUIV: -0.625
OD_SPHEQUIV: 0.875

## 2024-01-15 ASSESSMENT — REFRACTION_CURRENTRX
OS_OVR_VA: 20/
OD_OVR_VA: 20/

## 2024-01-15 ASSESSMENT — DECREASING TEAR LAKE - SEVERITY SCORE
OD_DEC_TEARLAKE: T
OS_DEC_TEARLAKE: T

## 2024-01-15 ASSESSMENT — REFRACTION_AUTOREFRACTION
OS_SPHERE: -0.25
OD_CYLINDER: -1.00
OD_SPHERE: +0.12
OS_CYLINDER: -0.75
OS_AXIS: 056
OD_AXIS: 094

## 2024-01-15 ASSESSMENT — CONFRONTATIONAL VISUAL FIELD TEST (CVF)
OS_FINDINGS: FULL
OD_FINDINGS: FULL

## 2024-04-16 ENCOUNTER — DOCTOR'S OFFICE (OUTPATIENT)
Dept: URBAN - NONMETROPOLITAN AREA CLINIC 1 | Facility: CLINIC | Age: 74
Setting detail: OPHTHALMOLOGY
End: 2024-04-16
Payer: COMMERCIAL

## 2024-04-16 DIAGNOSIS — H53.19: ICD-10-CM

## 2024-04-16 DIAGNOSIS — H40.051: ICD-10-CM

## 2024-04-16 DIAGNOSIS — H04.123: ICD-10-CM

## 2024-04-16 PROCEDURE — 92083 EXTENDED VISUAL FIELD XM: CPT | Performed by: OPHTHALMOLOGY

## 2024-04-16 PROCEDURE — 99213 OFFICE O/P EST LOW 20 MIN: CPT | Performed by: OPHTHALMOLOGY

## 2024-10-10 ENCOUNTER — ANESTHESIA (OUTPATIENT)
Dept: GASTROENTEROLOGY | Facility: HOSPITAL | Age: 74
End: 2024-10-10
Payer: COMMERCIAL

## 2024-10-10 ENCOUNTER — HOSPITAL ENCOUNTER (OUTPATIENT)
Dept: GASTROENTEROLOGY | Facility: HOSPITAL | Age: 74
Setting detail: OUTPATIENT SURGERY
End: 2024-10-10
Attending: HOSPITALIST
Payer: COMMERCIAL

## 2024-10-10 ENCOUNTER — ANESTHESIA EVENT (OUTPATIENT)
Dept: GASTROENTEROLOGY | Facility: HOSPITAL | Age: 74
End: 2024-10-10
Payer: COMMERCIAL

## 2024-10-10 VITALS
SYSTOLIC BLOOD PRESSURE: 122 MMHG | BODY MASS INDEX: 31.83 KG/M2 | HEART RATE: 56 BPM | RESPIRATION RATE: 16 BRPM | DIASTOLIC BLOOD PRESSURE: 69 MMHG | HEIGHT: 68 IN | TEMPERATURE: 97 F | WEIGHT: 210 LBS | OXYGEN SATURATION: 97 %

## 2024-10-10 DIAGNOSIS — K22.70 BARRETT'S ESOPHAGUS WITHOUT DYSPLASIA: ICD-10-CM

## 2024-10-10 PROCEDURE — 88305 TISSUE EXAM BY PATHOLOGIST: CPT | Performed by: SPECIALIST

## 2024-10-10 RX ORDER — EPHEDRINE SULFATE 50 MG/ML
INJECTION INTRAVENOUS AS NEEDED
Status: DISCONTINUED | OUTPATIENT
Start: 2024-10-10 | End: 2024-10-10

## 2024-10-10 RX ORDER — SODIUM CHLORIDE, SODIUM LACTATE, POTASSIUM CHLORIDE, CALCIUM CHLORIDE 600; 310; 30; 20 MG/100ML; MG/100ML; MG/100ML; MG/100ML
INJECTION, SOLUTION INTRAVENOUS CONTINUOUS PRN
Status: DISCONTINUED | OUTPATIENT
Start: 2024-10-10 | End: 2024-10-10

## 2024-10-10 RX ORDER — LIDOCAINE HYDROCHLORIDE 10 MG/ML
INJECTION, SOLUTION EPIDURAL; INFILTRATION; INTRACAUDAL; PERINEURAL AS NEEDED
Status: DISCONTINUED | OUTPATIENT
Start: 2024-10-10 | End: 2024-10-10

## 2024-10-10 RX ORDER — PROPOFOL 10 MG/ML
INJECTION, EMULSION INTRAVENOUS AS NEEDED
Status: DISCONTINUED | OUTPATIENT
Start: 2024-10-10 | End: 2024-10-10

## 2024-10-10 RX ADMIN — LIDOCAINE HYDROCHLORIDE 50 MG: 10 INJECTION, SOLUTION EPIDURAL; INFILTRATION; INTRACAUDAL; PERINEURAL at 13:12

## 2024-10-10 RX ADMIN — PROPOFOL 50 MG: 10 INJECTION, EMULSION INTRAVENOUS at 13:15

## 2024-10-10 RX ADMIN — PROPOFOL 80 MG: 10 INJECTION, EMULSION INTRAVENOUS at 13:12

## 2024-10-10 RX ADMIN — PROPOFOL 50 MG: 10 INJECTION, EMULSION INTRAVENOUS at 13:17

## 2024-10-10 RX ADMIN — SODIUM CHLORIDE, SODIUM LACTATE, POTASSIUM CHLORIDE, AND CALCIUM CHLORIDE: .6; .31; .03; .02 INJECTION, SOLUTION INTRAVENOUS at 13:00

## 2024-10-10 RX ADMIN — EPHEDRINE SULFATE 5 MG: 50 INJECTION, SOLUTION INTRAVENOUS at 13:20

## 2024-10-10 RX ADMIN — Medication 40 MG: at 13:17

## 2024-10-10 NOTE — ANESTHESIA POSTPROCEDURE EVALUATION
Post-Op Assessment Note    CV Status:  Stable    Pain management: adequate       Mental Status:  Alert and awake   Hydration Status:  Euvolemic   PONV Controlled:  Controlled   Airway Patency:  Patent     Post Op Vitals Reviewed: Yes    No anethesia notable event occurred.    Staff: CRNA           Last Filed PACU Vitals:  Vitals Value Taken Time   Temp 97.5    Pulse 68    /57    Resp 18    SpO2 98%        Modified Taryn:  Activity: 2 (10/10/2024 12:18 PM)  Respiration: 2 (10/10/2024 12:18 PM)  Circulation: 2 (10/10/2024 12:18 PM)  Consciousness: 2 (10/10/2024 12:18 PM)  Oxygen Saturation: 2 (10/10/2024 12:18 PM)  Modified Taryn Score: 10 (10/10/2024 12:18 PM)

## 2024-10-10 NOTE — H&P
Procedure(s):    Esophagogastroduodenuoscopy with the indication(s) of Figueredo's esophagus      Vitals:    10/10/24 1225   BP: 161/76   Pulse: 67   Resp: 18   Temp: 97.5 °F (36.4 °C)   SpO2: 97%       Physical Exam:  Physical Exam  HENT:      Nose: Nose normal.   Eyes:      Conjunctiva/sclera: Conjunctivae normal.   Cardiovascular:      Rate and Rhythm: Normal rate.   Pulmonary:      Effort: Pulmonary effort is normal.   Abdominal:      Palpations: Abdomen is soft.   Neurological:      General: No focal deficit present.      Mental Status: He is alert.   Psychiatric:         Mood and Affect: Mood normal.              Endoscopy Pre-Procedure Assessment:  Prior to the procedure, the patient is identified.  The patient's history, medications, and allergies have been reviewed.  The patient is competent.     Consent:    We have discussed the procedure in detail. We reviewed risks, benefits and alternative as well as potentional complications including and not limited to medication side effect , infection, bleeding, perforation and the potential need for surgery, ICU admission, CPR, as well as the need for blood product transfusion. Patient verbalized understanding and agreement. All patient questions were answered.     After reviewed the risks and benefits, the patient is deemed in satisfactory condition to undergo the procedure.  The anesthesia plan is to use monitored anesthesia care (MAC).      10/10/24

## 2024-10-10 NOTE — ANESTHESIA POSTPROCEDURE EVALUATION
Post-Op Assessment Note    Last Filed PACU Vitals:  Vitals Value Taken Time   Temp 97 °F (36.1 °C) 10/10/24 1355   Pulse 72 10/10/24 1355   /62 10/10/24 1355   Resp 11 10/10/24 1355   SpO2 96 % 10/10/24 1355       Modified Taryn:  Activity: 2 (10/10/2024  2:10 PM)  Respiration: 2 (10/10/2024  2:10 PM)  Circulation: 2 (10/10/2024  2:10 PM)  Consciousness: 2 (10/10/2024  2:10 PM)  Oxygen Saturation: 2 (10/10/2024  2:10 PM)  Modified Taryn Score: 10 (10/10/2024  2:10 PM)

## 2024-10-10 NOTE — ANESTHESIA PREPROCEDURE EVALUATION
Procedure:  EGD    Relevant Problems   /RENAL   (+) Chronic kidney disease, stage 3b (HCC)      HTN    Smoking    GERD  Physical Exam    Airway    Mallampati score: II  TM Distance: >3 FB  Neck ROM: full     Dental   No notable dental hx     Cardiovascular  Cardiovascular exam normal    Pulmonary  Pulmonary exam normal     Other Findings      Hx CVA      Anesthesia Plan  ASA Score- 3     Anesthesia Type- IV sedation with anesthesia with ASA Monitors.         Additional Monitors:     Airway Plan:            Plan Factors-Exercise tolerance (METS): >4 METS.    Chart reviewed.  Imaging results reviewed. Existing labs reviewed. Patient summary reviewed.    Patient is a current smoker.      Obstructive sleep apnea risk education given perioperatively.        Induction- intravenous.    Postoperative Plan-     Perioperative Resuscitation Plan - Level 1 - Full Code.       Informed Consent- Anesthetic plan and risks discussed with patient.  I personally reviewed this patient with the CRNA. Discussed and agreed on the Anesthesia Plan with the CRNA..    Left Ventricle: Left ventricle is normal in size. Systolic function is   normal with an ejection fraction of 55-60%. Wall motion is within normal   limits. There is normal diastolic function.     Left Atrium: Left atrium cavity size is normal.     Right Ventricle: Right ventricle cavity is normal. Systolic function is   normal. Abnormal tricuspid annular plane systolic excursion (TAPSE) <1.7   cm.    Tricuspid Valve: There is mild regurgitation.     Mitral Valve: There is mild annular calcification. There is mild   regurgitation.    Ascending Aorta: The aorta appears normal in size.     Pericardium: There is no pericardial effusion.     Pulmonic Valve: PA pressure not calculated due to incomplete TR signal.

## 2024-10-15 ENCOUNTER — DOCTOR'S OFFICE (OUTPATIENT)
Dept: URBAN - NONMETROPOLITAN AREA CLINIC 1 | Facility: CLINIC | Age: 74
Setting detail: OPHTHALMOLOGY
End: 2024-10-15
Payer: COMMERCIAL

## 2024-10-15 DIAGNOSIS — H04.123: ICD-10-CM

## 2024-10-15 DIAGNOSIS — H43.813: ICD-10-CM

## 2024-10-15 DIAGNOSIS — H35.3132: ICD-10-CM

## 2024-10-15 DIAGNOSIS — H35.373: ICD-10-CM

## 2024-10-15 PROCEDURE — 88305 TISSUE EXAM BY PATHOLOGIST: CPT | Performed by: SPECIALIST

## 2024-10-15 PROCEDURE — 92134 CPTRZ OPH DX IMG PST SGM RTA: CPT | Performed by: OPHTHALMOLOGY

## 2024-10-15 PROCEDURE — 92014 COMPRE OPH EXAM EST PT 1/>: CPT | Performed by: OPHTHALMOLOGY

## 2024-10-15 ASSESSMENT — REFRACTION_CURRENTRX
OD_OVR_VA: 20/
OS_OVR_VA: 20/

## 2024-10-15 ASSESSMENT — REFRACTION_MANIFEST
OD_AXIS: 105
OD_ADD: +2.50
OS_AXIS: 065
OS_VA2: 20/60-2
OD_CYLINDER: -1.25
OS_CYLINDER: -1.00
OS_SPHERE: +0.50
OS_VA1: 20/60-2
OD_VA2: 20/25+2
OS_ADD: +0.75
OD_SPHERE: +1.50
OD_VA1: 20/25+2

## 2024-10-15 ASSESSMENT — PACHYMETRY
OS_CT_CORRECTION: -1
OS_CT_UM: 553
OD_CT_CORRECTION: -1
OD_CT_UM: 557

## 2024-10-15 ASSESSMENT — CONFRONTATIONAL VISUAL FIELD TEST (CVF)
OD_FINDINGS: FULL
OS_FINDINGS: FULL

## 2024-10-15 ASSESSMENT — REFRACTION_AUTOREFRACTION
OS_AXIS: 053
OD_AXIS: 076
OD_CYLINDER: -1.00
OS_CYLINDER: -0.75
OS_SPHERE: -0.25
OD_SPHERE: 0.00

## 2024-10-15 ASSESSMENT — KERATOMETRY
OD_AXISANGLE_DEGREES: 026
OD_K2POWER_DIOPTERS: 7.31
OS_AXISANGLE_DEGREES: 162
OS_K1POWER_DIOPTERS: 7.38
OS_K2POWER_DIOPTERS: 7.28
OD_K1POWER_DIOPTERS: 7.44

## 2024-10-15 ASSESSMENT — TONOMETRY
OD_IOP_MMHG: 15
OS_IOP_MMHG: 16

## 2024-10-15 ASSESSMENT — VISUAL ACUITY
OD_BCVA: 20/25-2
OS_BCVA: 20/25-1

## 2024-12-06 RX ORDER — ATORVASTATIN CALCIUM 40 MG/1
40 TABLET, FILM COATED ORAL DAILY
COMMUNITY

## 2024-12-06 RX ORDER — PANTOPRAZOLE SODIUM 40 MG/1
40 TABLET, DELAYED RELEASE ORAL DAILY
COMMUNITY
Start: 2024-09-29

## 2024-12-11 ENCOUNTER — OFFICE VISIT (OUTPATIENT)
Dept: UROLOGY | Facility: CLINIC | Age: 74
End: 2024-12-11
Payer: COMMERCIAL

## 2024-12-11 VITALS
BODY MASS INDEX: 32.49 KG/M2 | WEIGHT: 214.4 LBS | TEMPERATURE: 97.7 F | HEIGHT: 68 IN | HEART RATE: 80 BPM | SYSTOLIC BLOOD PRESSURE: 130 MMHG | OXYGEN SATURATION: 97 % | DIASTOLIC BLOOD PRESSURE: 68 MMHG

## 2024-12-11 DIAGNOSIS — R97.20 ELEVATED PSA: Primary | ICD-10-CM

## 2024-12-11 DIAGNOSIS — N18.32 CHRONIC KIDNEY DISEASE, STAGE 3B (HCC): ICD-10-CM

## 2024-12-11 LAB
SL AMB  POCT GLUCOSE, UA: ABNORMAL
SL AMB LEUKOCYTE ESTERASE,UA: ABNORMAL
SL AMB POCT BILIRUBIN,UA: ABNORMAL
SL AMB POCT BLOOD,UA: ABNORMAL
SL AMB POCT CLARITY,UA: CLEAR
SL AMB POCT COLOR,UA: YELLOW
SL AMB POCT KETONES,UA: ABNORMAL
SL AMB POCT NITRITE,UA: ABNORMAL
SL AMB POCT PH,UA: 5.5
SL AMB POCT SPECIFIC GRAVITY,UA: 1.01
SL AMB POCT URINE PROTEIN: ABNORMAL
SL AMB POCT UROBILINOGEN: 0.2

## 2024-12-11 PROCEDURE — 99213 OFFICE O/P EST LOW 20 MIN: CPT | Performed by: UROLOGY

## 2024-12-11 PROCEDURE — 81003 URINALYSIS AUTO W/O SCOPE: CPT | Performed by: UROLOGY

## 2024-12-11 NOTE — PROGRESS NOTES
UROLOGY PROGRESS NOTE         NAME: Neymar Gordon  AGE: 74 y.o. SEX: male  : 1950   MRN: [id number]    DATE: 2024  TIME: 2:40 PM    Assessment and Plan      Impression:   1. Elevated PSA  -     POCT urine dip auto non-scope  -     PSA Total, Diagnostic; Future  2. Chronic kidney disease, stage 3b (HCC)       Plan: Patient with stable PSA.  MRI at the time of PSA of 6.2 was RADS 2 low probability of prostate cancer being present.  PSA remains stable.  Will see patient back in 1 year but requested PSA every 6 months.  He is agreeable with this.  He did not want a prostate exam.      Chief Complaint     Chief Complaint   Patient presents with   • Follow-up     Follow up PSA     History of Present Illness     HPI: Neymar Gordon is a 74 y.o. year old male who presents with history of an elevated PSA.  Previously evaluated with prostate MRI which was read as RADS 2 low risk of prostate cancer being present when PSA was 6.2 back in .  Patient here for follow-up.  PSA recently 6.15.  Patient is doing well.  He denies any significant voiding issues.  Urinalysis today was satisfactory.  He has minimal microhematuria which apparently was evaluated in the past.  He declined a digital rectal exam today.              The following portions of the patient's history were reviewed and updated as appropriate: allergies, current medications, past family history, past medical history, past social history, past surgical history and problem list.  Past Medical History:   Diagnosis Date   • Abdominal aortic aneurysm (AAA) (HCC)    • Figueredo's esophagus    • Benign paroxysmal vertigo 2004   • Benign prostatic hyperplasia    • Cataract of both eyes    • Chronic kidney disease     CKD   • Elevated PSA    • Family history of kidney cancer    • Fatty liver    • GERD (gastroesophageal reflux disease)    • Hematuria 2005   • History of CVA (cerebrovascular accident)    • Hx of colonic polyp    • Hx of  "transient ischemic attack (TIA)    • Hyperlipidemia    • Hypertension     In past   • Macular degeneration of both eyes    • Malaise and fatigue 02/19/2004   • Migraine variant, intractable    • Panic disorder 08/30/2002   • Persistent insomnia 01/03/2003   • Prostatitis    • Rosacea    • Subjective tinnitus    • Testicular hypofunction    • Testosterone deficiency    • Tobacco use disorder    • Varicocele      Past Surgical History:   Procedure Laterality Date   • CYSTOSCOPY       shoulder  Review of Systems     Const: Denies chills, fever and weight loss.  CV: Denies chest pain.  Resp: Denies SOB.  GI: Denies abdominal pain, nausea and vomiting.  : Denies symptoms other than stated above.  Musculo: Denies back pain.    Objective   /68 (BP Location: Left arm, Patient Position: Sitting, Cuff Size: Large)   Pulse 80   Temp 97.7 °F (36.5 °C) (Temporal)   Ht 5' 8\" (1.727 m)   Wt 97.3 kg (214 lb 6.4 oz)   SpO2 97%   BMI 32.60 kg/m²     Physical Exam  Const: Appears healthy and well developed. No signs of acute distress present.  Resp: Respirations are regular and unlabored.   CV: Rate is regular. Rhythm is regular.  Abdomen: Abdomen is soft, nontender, and nondistended. Kidneys are not palpable.  : Not performed.  Recommended YU but patient declined.  Psych: Patient's attitude is cooperative. Mood is normal. Affect is normal.    Procedure   Procedures     Current Medications     Current Outpatient Medications:   •  acetaminophen (TYLENOL) 500 mg tablet, Take 500 mg by mouth every 6 (six) hours as needed, Disp: , Rfl:   •  aspirin (ECOTRIN LOW STRENGTH) 81 mg EC tablet, 81 mg, Disp: , Rfl:   •  atorvastatin (LIPITOR) 40 mg tablet, Take 40 mg by mouth daily, Disp: , Rfl:   •  Cholecalciferol (D3-1000) 25 MCG (1000 UT) capsule, Take 1,000 Units by mouth daily, Disp: , Rfl:   •  ezetimibe (ZETIA) 10 mg tablet, Take 1 tablet by mouth daily, Disp: , Rfl:   •  LORazepam (Ativan) 1 mg tablet, Take 1 mg by mouth " every 8 (eight) hours as needed, Disp: , Rfl:   •  Omega-3 Fatty Acids (fish oil) 1,000 mg, Take 1,000 mg by mouth daily, Disp: , Rfl:   •  pantoprazole (PROTONIX) 40 mg tablet, Take 40 mg by mouth daily, Disp: , Rfl:   •  sucralfate (CARAFATE) 1 g tablet, Take 1 g by mouth Three times daily as needed, Disp: , Rfl:         Dejuan Sanabria MD

## 2025-05-23 ENCOUNTER — TELEPHONE (OUTPATIENT)
Dept: UROLOGY | Facility: CLINIC | Age: 75
End: 2025-05-23

## 2025-05-23 DIAGNOSIS — R97.20 ELEVATED PSA: Primary | ICD-10-CM

## 2025-05-23 NOTE — TELEPHONE ENCOUNTER
Received PSA from 5/21/25 was 7.35. This has gone up since his last PSA done on 12/4/24 it was 6.15. Please advise. Patient last seen in the office on 12/11/24 and is to follow up in a year.

## 2025-05-23 NOTE — TELEPHONE ENCOUNTER
Patient has had fluctuations in his PSA in the past.  Would recommend rechecking PSA in approximately 6 to 8 weeks.  If persistently elevated would recommend follow-up visit with Dr. Sanabria to discuss possible need for repeat mpMRI of the prostate.

## 2025-05-23 NOTE — TELEPHONE ENCOUNTER
Patient returned missed call. Message was relayed. Patient will got to [facility] lab in 6-8 weeks to have PSA rechecked. Patient will await returned call with results.

## 2025-06-03 ENCOUNTER — RESULTS FOLLOW-UP (OUTPATIENT)
Dept: UROLOGY | Facility: CLINIC | Age: 75
End: 2025-06-03

## 2025-07-22 ENCOUNTER — APPOINTMENT (OUTPATIENT)
Dept: LAB | Facility: CLINIC | Age: 75
End: 2025-07-22
Payer: COMMERCIAL

## 2025-07-22 DIAGNOSIS — R97.20 ELEVATED PSA: ICD-10-CM

## 2025-07-22 LAB
PSA FREE MFR SERPL: 14.45 %
PSA FREE SERPL-MCNC: 0.92 NG/ML
PSA SERPL-MCNC: 6.36 NG/ML (ref 0–4)

## 2025-07-22 PROCEDURE — 36415 COLL VENOUS BLD VENIPUNCTURE: CPT

## 2025-07-22 PROCEDURE — 84154 ASSAY OF PSA FREE: CPT

## 2025-07-22 PROCEDURE — 84153 ASSAY OF PSA TOTAL: CPT

## 2025-07-23 ENCOUNTER — RESULTS FOLLOW-UP (OUTPATIENT)
Age: 75
End: 2025-07-23

## 2025-07-23 NOTE — TELEPHONE ENCOUNTER
VM left for pt, message relayed from ANNA Sesay N.P.  Asked to call back and book appt with  as follow up.

## 2025-07-23 NOTE — RESULT ENCOUNTER NOTE
Please let patient know that his PSA has improved to 6.355.  This is around prior baseline.  I would recommend follow-up visit in the near future with Dr. Sanabria to discuss continued observation versus possible repeat multiparametric MRI of the prostate.